# Patient Record
Sex: MALE | Race: WHITE | NOT HISPANIC OR LATINO | Employment: FULL TIME | URBAN - METROPOLITAN AREA
[De-identification: names, ages, dates, MRNs, and addresses within clinical notes are randomized per-mention and may not be internally consistent; named-entity substitution may affect disease eponyms.]

---

## 2024-10-04 ENCOUNTER — TELEPHONE (OUTPATIENT)
Dept: ORTHOPEDICS | Facility: CLINIC | Age: 54
End: 2024-10-04
Payer: COMMERCIAL

## 2024-10-04 NOTE — TELEPHONE ENCOUNTER
----- Message from Tech Cliff sent at 10/3/2024  3:39 PM CDT -----  Regarding: New Referral  Initial call completed, Fill-able uploaded    No Specialist     No known allergies or skin conditons     Dental   [facility]   -clr'd     Baldomero Daniels MD -ortho  [address], [place] [address] [phone]    Rayshawn Pacheco MD -pcp  [address], [place] [address] [phone]

## 2024-10-04 NOTE — TELEPHONE ENCOUNTER
- no WC/ litigation  - no active cancer  - no nicotine use (quit 8 years ago)  - not diabetic; advised of A1c   - BMI is 28, understands BMI requirement    Discussed program workflow and coverages. Aware he will need to board pets.   Ortho and pcp records requested in eHealth. Advised he may need additional bloodwork  Advised we have rec'd dental clearance.     He has my number to call with any questions.

## 2024-10-08 ENCOUNTER — PATIENT MESSAGE (OUTPATIENT)
Dept: ORTHOPEDICS | Facility: CLINIC | Age: 54
End: 2024-10-08
Payer: COMMERCIAL

## 2024-10-18 ENCOUNTER — TELEPHONE (OUTPATIENT)
Dept: ORTHOPEDICS | Facility: CLINIC | Age: 54
End: 2024-10-18
Payer: COMMERCIAL

## 2024-10-18 DIAGNOSIS — Z01.818 PREOP TESTING: Primary | ICD-10-CM

## 2024-10-18 RX ORDER — MULTIVITAMIN
1 TABLET ORAL DAILY
COMMUNITY

## 2024-10-18 RX ORDER — DEXTROMETHORPHAN HYDROBROMIDE, GUAIFENESIN 5; 100 MG/5ML; MG/5ML
650 LIQUID ORAL EVERY 8 HOURS
COMMUNITY

## 2024-10-18 RX ORDER — LATANOPROST 50 UG/ML
1 SOLUTION/ DROPS OPHTHALMIC NIGHTLY
COMMUNITY

## 2024-10-18 RX ORDER — BRIMONIDINE TARTRATE 1.5 MG/ML
1 SOLUTION/ DROPS OPHTHALMIC 3 TIMES DAILY
COMMUNITY

## 2024-10-18 NOTE — TELEPHONE ENCOUNTER
Spoke to patient on: 10/18/24    Surgeon:  Dr. Hubbard    Surgery: right Hip    Travel caregiver:  Samir Mcwilliams    Will travel via: car    BMI: 28.2    Social Hx:     Updated in Epic              Patient works at [employer]            Plan to return home after surgery:  Yes            Have support to help with care and appointments: Yes     Allergies: Updated in Epic    Medication list: Updated in Epic    Covid-19:   - vaccine: yes   - diagnosed with: no        Health Hx:  Updated in [facility]    Surgical Hx:   Updated in Epic    Can you take one flight of stairs: yes    RCRI:   - Coronary Artery Disease: no   - Congestive Heart Failure: no   - Cerebrovascular Disease: no   - Diabetes Mellitus on insulin: no      ROS:   - Fever/chills: no   - Infection: no   - Cough/Resp Issues:  no   - Bleeding/blood loss (vaginal, rectal, vomiting, urination):  no   - GI issues/Acid Reflux/Vomiting: no   - Fatty liver: no   - UTI: no   - Constipation:  no   - MRSA colonization: no  - Skin issues (rashes, blisters, boils, bumps): no; will let me know if anything changes   - Recent injection in operative joint: no   - Strokes or Passing out: no   - Blood Thinners or ASA: no   - Blood clot in heart/lung/extremity: no   - Stents: no   - Depression or Anxiety: yes - history of, situational, took medicine, no longer on meds but feels like he's coping well   - Problems with anesthesia:  no   - If female, having periods/pregnant: N/A  - Vision problems: yes, glasses  - Dental exam recently: yes    JOSHUA   Has WILLIE with CPAP    Family Hx:    Updated in Epic      Hip replacement patients do not have PT scheduled after returning home.     Discussed stay, DME, LUIS, PT - ample time allowed for question and answer, v/u of all teaching       [id number] - WIN

## 2024-10-21 ENCOUNTER — TELEPHONE (OUTPATIENT)
Dept: ORTHOPEDICS | Facility: CLINIC | Age: 54
End: 2024-10-21
Payer: COMMERCIAL

## 2024-10-22 ENCOUNTER — TELEPHONE (OUTPATIENT)
Dept: ORTHOPEDICS | Facility: CLINIC | Age: 54
End: 2024-10-22
Payer: COMMERCIAL

## 2024-10-22 PROBLEM — R42 VERTIGO: Status: ACTIVE | Noted: 2024-10-22

## 2024-10-22 PROBLEM — E78.5 HYPERLIPIDEMIA: Status: ACTIVE | Noted: 2024-10-22

## 2024-10-22 PROBLEM — G47.33 OSA (OBSTRUCTIVE SLEEP APNEA): Status: ACTIVE | Noted: 2024-10-22

## 2024-10-22 PROBLEM — K63.5 COLON POLYPS: Status: ACTIVE | Noted: 2024-10-22

## 2024-10-22 PROBLEM — M16.11 OSTEOARTHRITIS OF RIGHT HIP: Status: ACTIVE | Noted: 2024-10-22

## 2024-10-22 PROBLEM — G25.81 RESTLESS LEG: Status: ACTIVE | Noted: 2024-10-22

## 2024-10-22 PROBLEM — M54.50 LOW BACK PAIN: Status: ACTIVE | Noted: 2024-10-22

## 2024-10-22 NOTE — TELEPHONE ENCOUNTER
Contacted patient.   Updated on surgeon approval, still waiting on IM approval  Cannot make 11/11 date.  Would like 11/20 date.   Updated waiting on p/o agmt from [facility].

## 2024-10-23 ENCOUNTER — PATIENT MESSAGE (OUTPATIENT)
Dept: ORTHOPEDICS | Facility: CLINIC | Age: 54
End: 2024-10-23
Payer: COMMERCIAL

## 2024-10-23 DIAGNOSIS — M16.11 PRIMARY OSTEOARTHRITIS OF RIGHT HIP: Primary | ICD-10-CM

## 2024-10-23 NOTE — TELEPHONE ENCOUNTER
[phone]  Dr. Pacheco       - opt 3  Leave message for nurse - opt 4    Spoke with  - they did receive post op agreement letter.    Spoke with nurse - explained situation - said provider is in office and she will try to get an answer today and send it back

## 2024-11-18 ENCOUNTER — TELEPHONE (OUTPATIENT)
Dept: ORTHOPEDICS | Facility: CLINIC | Age: 54
End: 2024-11-18
Payer: COMMERCIAL

## 2024-11-18 NOTE — TELEPHONE ENCOUNTER
I called the patient today regarding surgery on 11/20/2024 with Dr. Jarod Hubbard. I informed the patient that his surgery will be at  [facility] ([facility] [place] [place] [address]). I informed the patient they must arrive at 7:30am and their surgery will start at 9:30am.     Per the [facility] COVID-19 Pre-Procedural Testing Policy (administered 10/26/2022), patients do not need tested for COVID-19 regardless of vaccination status.    I reminded the patient that they cannot eat or drink after midnight, the night before surgery.      I reminded the patient to be careful of their skin over the next few days to make sure they do not get any cuts, scratches or scrapes.    The patient has not yet received their walker, bedside commode or shower chair from [facility]. I told the patient that she needs to call [facility] today at [phone].    The patient verbalized understanding and has no further questions.

## 2024-11-18 NOTE — ASSESSMENT & PLAN NOTE
This client has a possible diagnosis of obstructive sleep apnea (WILLIE)  Instructions were given to avoid the following: sleeping supine, weight gain ,alcoholic beverages , sedative medications, and CNS depressant use as these can all worsen WILLIE.    Untreated sleep apnea has been shown to increase daytime sleepiness, hypertension, heart disease and stroke which were discussed with the patient at this time  Please use caution with medications that induce respiratory depression in the perioperative period

## 2024-11-19 ENCOUNTER — OFFICE VISIT (OUTPATIENT)
Dept: ORTHOPEDICS | Facility: CLINIC | Age: 54
End: 2024-11-19
Payer: COMMERCIAL

## 2024-11-19 ENCOUNTER — HOSPITAL ENCOUNTER (OUTPATIENT)
Dept: RADIOLOGY | Facility: HOSPITAL | Age: 54
Discharge: HOME | End: 2024-11-19
Attending: ORTHOPAEDIC SURGERY
Payer: COMMERCIAL

## 2024-11-19 ENCOUNTER — OFFICE VISIT (OUTPATIENT)
Dept: INTERNAL MEDICINE | Facility: CLINIC | Age: 54
End: 2024-11-19
Payer: COMMERCIAL

## 2024-11-19 ENCOUNTER — TELEPHONE (OUTPATIENT)
Dept: ORTHOPEDICS | Facility: CLINIC | Age: 54
End: 2024-11-19
Payer: COMMERCIAL

## 2024-11-19 VITALS
HEART RATE: 58 BPM | TEMPERATURE: 98 F | BODY MASS INDEX: 28.85 KG/M2 | SYSTOLIC BLOOD PRESSURE: 143 MMHG | HEIGHT: 73 IN | OXYGEN SATURATION: 98 % | WEIGHT: 217.69 LBS | DIASTOLIC BLOOD PRESSURE: 83 MMHG

## 2024-11-19 VITALS — WEIGHT: 215.94 LBS | BODY MASS INDEX: 29.25 KG/M2 | HEIGHT: 72 IN

## 2024-11-19 DIAGNOSIS — R03.0 ELEVATED BLOOD PRESSURE READING IN OFFICE WITHOUT DIAGNOSIS OF HYPERTENSION: ICD-10-CM

## 2024-11-19 DIAGNOSIS — M16.11 PRIMARY OSTEOARTHRITIS OF RIGHT HIP: Primary | ICD-10-CM

## 2024-11-19 DIAGNOSIS — M16.11 PRIMARY OSTEOARTHRITIS OF RIGHT HIP: ICD-10-CM

## 2024-11-19 DIAGNOSIS — G25.81 RESTLESS LEG: ICD-10-CM

## 2024-11-19 DIAGNOSIS — G47.33 OSA (OBSTRUCTIVE SLEEP APNEA): Primary | ICD-10-CM

## 2024-11-19 DIAGNOSIS — E78.5 HYPERLIPIDEMIA, UNSPECIFIED HYPERLIPIDEMIA TYPE: ICD-10-CM

## 2024-11-19 DIAGNOSIS — R42 VERTIGO: ICD-10-CM

## 2024-11-19 DIAGNOSIS — Z96.641 S/P TOTAL RIGHT HIP ARTHROPLASTY: Primary | ICD-10-CM

## 2024-11-19 PROCEDURE — 99499 UNLISTED E&M SERVICE: CPT | Mod: S$GLB,COE,,

## 2024-11-19 PROCEDURE — 99203 OFFICE O/P NEW LOW 30 MIN: CPT | Mod: S$GLB,COE,, | Performed by: NURSE PRACTITIONER

## 2024-11-19 PROCEDURE — 99999 PR PBB SHADOW E&M-EST. PATIENT-LVL II: CPT | Mod: PBBFAC,COE,,

## 2024-11-19 PROCEDURE — 99999 PR PBB SHADOW E&M-EST. PATIENT-LVL III: CPT | Mod: PBBFAC,COE,, | Performed by: ORTHOPAEDIC SURGERY

## 2024-11-19 PROCEDURE — 72170 X-RAY EXAM OF PELVIS: CPT | Mod: TC

## 2024-11-19 PROCEDURE — 99204 OFFICE O/P NEW MOD 45 MIN: CPT | Mod: S$GLB,COE,, | Performed by: ORTHOPAEDIC SURGERY

## 2024-11-19 PROCEDURE — 72170 X-RAY EXAM OF PELVIS: CPT | Mod: 26,COE,, | Performed by: RADIOLOGY

## 2024-11-19 PROCEDURE — 99999 PR PBB SHADOW E&M-EST. PATIENT-LVL III: CPT | Mod: PBBFAC,COE,, | Performed by: NURSE PRACTITIONER

## 2024-11-19 RX ORDER — FAMOTIDINE 20 MG/1
20 TABLET, FILM COATED ORAL 2 TIMES DAILY
Status: CANCELLED | OUTPATIENT
Start: 2024-11-19

## 2024-11-19 RX ORDER — ACETAMINOPHEN 500 MG
1000 TABLET ORAL EVERY 6 HOURS
Status: CANCELLED | OUTPATIENT
Start: 2024-11-19

## 2024-11-19 RX ORDER — PREGABALIN 75 MG/1
75 CAPSULE ORAL
Status: CANCELLED | OUTPATIENT
Start: 2024-11-19

## 2024-11-19 RX ORDER — PROCHLORPERAZINE EDISYLATE 5 MG/ML
5 INJECTION INTRAMUSCULAR; INTRAVENOUS EVERY 6 HOURS PRN
Status: CANCELLED | OUTPATIENT
Start: 2024-11-19

## 2024-11-19 RX ORDER — SODIUM CHLORIDE 9 MG/ML
INJECTION, SOLUTION INTRAVENOUS
Status: CANCELLED | OUTPATIENT
Start: 2024-11-19

## 2024-11-19 RX ORDER — CELECOXIB 200 MG/1
200 CAPSULE ORAL DAILY
Qty: 30 CAPSULE | Refills: 0 | Status: SHIPPED | OUTPATIENT
Start: 2024-11-19

## 2024-11-19 RX ORDER — OXYCODONE HYDROCHLORIDE 5 MG/1
TABLET ORAL
Qty: 30 TABLET | Refills: 0 | Status: SHIPPED | OUTPATIENT
Start: 2024-11-19

## 2024-11-19 RX ORDER — ONDANSETRON HYDROCHLORIDE 2 MG/ML
4 INJECTION, SOLUTION INTRAVENOUS EVERY 8 HOURS PRN
Status: CANCELLED | OUTPATIENT
Start: 2024-11-19

## 2024-11-19 RX ORDER — CELECOXIB 200 MG/1
200 CAPSULE ORAL DAILY
Status: CANCELLED | OUTPATIENT
Start: 2024-11-20

## 2024-11-19 RX ORDER — NALOXONE HCL 0.4 MG/ML
0.02 VIAL (ML) INJECTION
Status: CANCELLED | OUTPATIENT
Start: 2024-11-19

## 2024-11-19 RX ORDER — FENTANYL CITRATE 50 UG/ML
25 INJECTION, SOLUTION INTRAMUSCULAR; INTRAVENOUS EVERY 5 MIN PRN
Status: CANCELLED | OUTPATIENT
Start: 2024-11-19

## 2024-11-19 RX ORDER — CELECOXIB 200 MG/1
400 CAPSULE ORAL ONCE
Status: CANCELLED | OUTPATIENT
Start: 2024-11-19 | End: 2024-11-19

## 2024-11-19 RX ORDER — ACETAMINOPHEN 500 MG
1000 TABLET ORAL
Status: CANCELLED | OUTPATIENT
Start: 2024-11-19

## 2024-11-19 RX ORDER — MORPHINE SULFATE 2 MG/ML
2 INJECTION, SOLUTION INTRAMUSCULAR; INTRAVENOUS
Status: CANCELLED | OUTPATIENT
Start: 2024-11-19

## 2024-11-19 RX ORDER — ASPIRIN 81 MG/1
81 TABLET ORAL 2 TIMES DAILY
Qty: 60 TABLET | Refills: 0 | Status: SHIPPED | OUTPATIENT
Start: 2024-11-19 | End: 2024-12-21

## 2024-11-19 RX ORDER — ASPIRIN 81 MG/1
81 TABLET ORAL 2 TIMES DAILY
Status: CANCELLED | OUTPATIENT
Start: 2024-11-19

## 2024-11-19 RX ORDER — FENTANYL CITRATE 50 UG/ML
100 INJECTION, SOLUTION INTRAMUSCULAR; INTRAVENOUS
Status: CANCELLED | OUTPATIENT
Start: 2024-11-19 | End: 2024-11-20

## 2024-11-19 RX ORDER — PREGABALIN 75 MG/1
75 CAPSULE ORAL NIGHTLY
Status: CANCELLED | OUTPATIENT
Start: 2024-11-19

## 2024-11-19 RX ORDER — POLYETHYLENE GLYCOL 3350 17 G/17G
17 POWDER, FOR SOLUTION ORAL DAILY PRN
Status: CANCELLED | OUTPATIENT
Start: 2024-11-19

## 2024-11-19 RX ORDER — DEXTROMETHORPHAN HYDROBROMIDE, GUAIFENESIN 5; 100 MG/5ML; MG/5ML
650 LIQUID ORAL EVERY 8 HOURS
Qty: 120 TABLET | Refills: 0 | Status: SHIPPED | OUTPATIENT
Start: 2024-11-19

## 2024-11-19 RX ORDER — LIDOCAINE HYDROCHLORIDE 10 MG/ML
1 INJECTION, SOLUTION EPIDURAL; INFILTRATION; INTRACAUDAL; PERINEURAL
Status: CANCELLED | OUTPATIENT
Start: 2024-11-19

## 2024-11-19 RX ORDER — MUPIROCIN 20 MG/G
1 OINTMENT TOPICAL 2 TIMES DAILY
Status: CANCELLED | OUTPATIENT
Start: 2024-11-19 | End: 2024-11-24

## 2024-11-19 RX ORDER — POLYETHYLENE GLYCOL 3350 17 G/17G
17 POWDER, FOR SOLUTION ORAL DAILY
Status: CANCELLED | OUTPATIENT
Start: 2024-11-20

## 2024-11-19 RX ORDER — AMOXICILLIN 250 MG
1 CAPSULE ORAL 2 TIMES DAILY
Status: CANCELLED | OUTPATIENT
Start: 2024-11-19

## 2024-11-19 RX ORDER — SODIUM CHLORIDE 9 MG/ML
INJECTION, SOLUTION INTRAVENOUS CONTINUOUS
Status: CANCELLED | OUTPATIENT
Start: 2024-11-19 | End: 2024-11-20

## 2024-11-19 RX ORDER — OXYCODONE HYDROCHLORIDE 5 MG/1
10 TABLET ORAL
Status: CANCELLED | OUTPATIENT
Start: 2024-11-19

## 2024-11-19 RX ORDER — MIDAZOLAM HYDROCHLORIDE 1 MG/ML
4 INJECTION, SOLUTION INTRAMUSCULAR; INTRAVENOUS
Status: CANCELLED | OUTPATIENT
Start: 2024-11-19 | End: 2024-11-20

## 2024-11-19 RX ORDER — CEFADROXIL 500 MG/1
500 CAPSULE ORAL EVERY 12 HOURS
Qty: 14 CAPSULE | Refills: 0 | Status: SHIPPED | OUTPATIENT
Start: 2024-11-19 | End: 2024-11-28

## 2024-11-19 RX ORDER — MUPIROCIN 20 MG/G
1 OINTMENT TOPICAL
Status: CANCELLED | OUTPATIENT
Start: 2024-11-19

## 2024-11-19 RX ORDER — METHOCARBAMOL 750 MG/1
750 TABLET, FILM COATED ORAL 4 TIMES DAILY PRN
Qty: 40 TABLET | Refills: 0 | Status: SHIPPED | OUTPATIENT
Start: 2024-11-19 | End: 2024-12-01

## 2024-11-19 RX ORDER — METHOCARBAMOL 750 MG/1
750 TABLET, FILM COATED ORAL 3 TIMES DAILY
Status: CANCELLED | OUTPATIENT
Start: 2024-11-19

## 2024-11-19 RX ORDER — PANTOPRAZOLE SODIUM 40 MG/1
40 TABLET, DELAYED RELEASE ORAL DAILY
Qty: 30 TABLET | Refills: 0 | Status: SHIPPED | OUTPATIENT
Start: 2024-11-19 | End: 2024-12-21

## 2024-11-19 RX ORDER — AMOXICILLIN 250 MG
1 CAPSULE ORAL DAILY
Qty: 30 TABLET | Refills: 0 | Status: SHIPPED | OUTPATIENT
Start: 2024-11-19

## 2024-11-19 RX ORDER — OXYCODONE HYDROCHLORIDE 5 MG/1
5 TABLET ORAL
Status: CANCELLED | OUTPATIENT
Start: 2024-11-19

## 2024-11-19 NOTE — HPI
This is a 54 y.o. male  who presents today for a preoperative evaluation in preparation for right hip replacement  Surgery is indicated for   osteoarthritis  .   Patient is new to me.    The history has been obtained by speaking with the patient and reviewing the electronic medical record and/or outside health information. Significant health conditions for the perioperative period are discussed below in assessment and plan.     Patient reports current health status to be Good.  Denies any new symptoms before surgery.

## 2024-11-19 NOTE — PROGRESS NOTES
[facility] [address]  Progress Note    Patient Name: Vladimir Frank  MRN: [id number]  Date of Evaluation- 11/19/2024  PCP- Rayshawn Pacheco MD    Future cases for Vladimir Frank [[id number]]       Case ID Status Date Time [place] Procedure Provider Location    [id number] [facility] 11/20/2024  9:30  ARTHROPLASTY, HIP, TOTAL, ANTERIOR APPROACH: RIGHT: DEPUY - ACTIS + PINNACLE: Jarod Yao III, MD [[id number]] [facility] OR            HPI:  This is a 54 y.o. male  who presents today for a preoperative evaluation in preparation for right hip replacement  Surgery is indicated for   osteoarthritis  .   Patient is new to me.    The history has been obtained by speaking with the patient and reviewing the electronic medical record and/or outside health information. Significant health conditions for the perioperative period are discussed below in assessment and plan.     Patient reports current health status to be Good.  Denies any new symptoms before surgery.       Subjective/ Objective:     Chief Complaint: Preoperative evaulation, perioperative medical management, and complication reduction plan.     Functional Capacity:  Able to climb a flight of stairs without CP SOB or Syncope.  Able to meet 4 METs      Anesthesia issues: None    Difficulty mouth opening: none    Steroid use in the last 12 months: celestone July for back pain    Dental Issues: none    Family anesthesia difficulty: None       Family Hx of Thrombosis  grandmother had h/o blood clots    Past Medical History:   Diagnosis Date    GERD (gastroesophageal reflux disease)     History of colonic polyps     Hypercholesteremia     Lumbosacral spondylosis with radiculopathy     WILLIE on CPAP     Primary osteoarthritis of right hip        Past Surgical History:   Procedure Laterality Date    COLONOSCOPY W/ BIOPSIES AND POLYPECTOMY      WISDOM TOOTH EXTRACTION         Review of Systems   Constitutional:  Negative for chills, fatigue, fever and unexpected weight change.  "  HENT:  Negative for trouble swallowing and voice change.    Eyes:  Negative for photophobia and visual disturbance.        No acute visual changes   Respiratory:  Negative for cough, shortness of breath and wheezing.         Has WILLIE and wears CPAP   Cardiovascular:  Negative for chest pain, palpitations and leg swelling.   Gastrointestinal:  Positive for constipation. Negative for abdominal pain, blood in stool, diarrhea, nausea and vomiting.        No FLD, Hepatitis, Cirrhosis  No BRB or black tarry stool   Genitourinary:  Negative for difficulty urinating, dysuria, frequency, hematuria and urgency.        Nocturia   Musculoskeletal:  Positive for arthralgias, back pain, gait problem and neck pain. Negative for myalgias and neck stiffness.   Neurological:  Negative for dizziness, tremors, seizures, syncope, weakness, light-headedness, numbness and headaches.   Psychiatric/Behavioral:  Negative for agitation, behavioral problems, confusion, decreased concentration, dysphoric mood, hallucinations, self-injury, sleep disturbance and suicidal ideas. The patient is not nervous/anxious and is not hyperactive.           VITALS  Visit Vitals  BP (!) 143/83 (BP Location: Right arm, Patient Position: Sitting)   Pulse (!) 58   Temp 98.2 °F (36.8 °C) (Oral)   Ht 6' 1" (1.854 m)   Wt 98.8 kg (217 lb 11.3 oz)   SpO2 98%   BMI 28.72 kg/m²          Physical Exam  Constitutional:       General: He is not in acute distress.     Appearance: He is well-developed. He is not diaphoretic.   HENT:      Head: Normocephalic.      Right Ear: Hearing normal.      Left Ear: Hearing normal.      Nose: Nose normal.      Mouth/Throat:      Lips: Pink.      Mouth: Mucous membranes are moist.   Eyes:      General: Lids are normal.      Conjunctiva/sclera: Conjunctivae normal.      Pupils: Pupils are equal, round, and reactive to light.   Neck:      Vascular: No carotid bruit.      Trachea: Trachea and phonation normal.   Cardiovascular:      Rate " and Rhythm: Normal rate and regular rhythm.      Pulses:           Carotid pulses are 2+ on the right side and 2+ on the left side.       Radial pulses are 2+ on the right side and 2+ on the left side.        Posterior tibial pulses are 2+ on the right side and 2+ on the left side.      Heart sounds: Normal heart sounds. No murmur heard.     No friction rub. No gallop.   Pulmonary:      Effort: Pulmonary effort is normal.      Breath sounds: Normal breath sounds.      Comments: Clear and equal  Anterior and Posterior BBS  Abdominal:      General: Abdomen is protuberant. Bowel sounds are normal. There is no distension.      Palpations: Abdomen is soft.      Tenderness: There is no abdominal tenderness.   Musculoskeletal:         General: No tenderness or deformity. Normal range of motion.      Cervical back: Normal range of motion.      Right lower leg: No edema.      Left lower leg: No edema.   Lymphadenopathy:      Head:      Right side of head: No submental, submandibular, tonsillar, preauricular, posterior auricular or occipital adenopathy.      Left side of head: No submental, submandibular, tonsillar, preauricular, posterior auricular or occipital adenopathy.      Cervical:      Right cervical: No superficial cervical adenopathy.     Left cervical: No superficial cervical adenopathy.   Skin:     General: Skin is warm and dry.      Capillary Refill: Capillary refill takes 2 to 3 seconds.      Coloration: Skin is not pale.      Findings: No erythema or rash.   Neurological:      Mental Status: He is alert and oriented to person, place, and time.      GCS: GCS eye subscore is 4. GCS verbal subscore is 5. GCS motor subscore is 6.      Motor: No abnormal muscle tone.      Coordination: Coordination normal.   Psychiatric:         Attention and Perception: Attention and perception normal.         Mood and Affect: Mood and affect normal.         Speech: Speech normal.         Behavior: Behavior normal. Behavior is  cooperative.         Thought Content: Thought content normal.         Cognition and Memory: Cognition and memory normal.         Judgment: Judgment normal.          Significant Labs:  Lab Results   Component Value Date    INR 1.04 10/18/2024    HGBA1C 5.2 10/18/2024       Diagnostic Studies: No relevant studies.    EKG:   No results found for this or any previous visit.    2D ECHO:  TTE:  No results found for this or any previous visit.    KAILEE:  No results found for this or any previous visit.     Imaging     Active Cardiac Conditions: None      Revised Cardiac Risk Index   High -Risk Surgery  Intraperitoneal; Intrathoracic; suprainguinal vascular Yes- + 1 No- 0   History of Ischemic Heart Disease   (Hx of MI/positive exercise test/current chest pain due to ischemia/use of nitrate therapy/EKG with pathological Q waves) Yes- + 1 No- 0   History of CHF  (Pulmonary edema/bilateral rales or S3 gallop/PND/CXR showing pulmonary vascular redistribution) Yes- + 1 No- 0   History of CVA   (Prior stroke or TIA) Yes- + 1 No- 0   Pre-operative treatment with insulin Yes- + 1 No- 0   Pre-operative creatinine > 2mg/dl Yes- + 1 No- 0   Total:    Risk Status:  Estimated risk of cardiac complications after non-cardiac surgery using the Revised Cardiac Risk Index for Preoperative risk is 3.9 %      ARISCAT (Canet) risk index: Low: 1.6% risk of post-op pulmonary complications.    American Society of Anesthesiologists Physical Status classification (ASA): 3             Preoperative cardiac risk assessment-  The patient does not have any active cardiac conditions . Revised cardiac risk index predictors- ---.Functional capacity is more than 4 Mets. He will be undergoing a Orthopedic procedure that carries a Moderate Risk and High Risk risk     The estimated risk of the rate of adverse cardiac outcomes  3.9    No further cardiac work up is indicated prior to proceeding with the surgery          American Society of Anesthesiologists  Physical status classification ( ASA ) class: 3  Postoperative pulmonary complication risk assessment: 1.6     ARISCAT ( Canet) risk index- risk class -  Low, if duration of surgery is under 3 hours, intermediate, if duration of surgery is over 3 hours      Assessment/Plan:     WILLIE (obstructive sleep apnea)  This client has a possible diagnosis of obstructive sleep apnea (WILLIE)  Instructions were given to avoid the following: sleeping supine, weight gain ,alcoholic beverages , sedative medications, and CNS depressant use as these can all worsen WILLIE.    Untreated sleep apnea has been shown to increase daytime sleepiness, hypertension, heart disease and stroke which were discussed with the patient at this time  Please use caution with medications that induce respiratory depression in the perioperative period    Hyperlipidemia  No longer takes Lipitor    Osteoarthritis of right hip  Surgery scheduled 11/20/24    Restless leg  Has RLS- mostly happens when stressed  No meds    Vertigo  Has not had vertigo for about 9 years    Elevated blood pressure reading in office without diagnosis of hypertension  /83      Preventive perioperative care    Thromboembolic prophylaxis:  His risk factors for thrombosis include surgical procedure, age, and reduced mobility.I suggest  thromboembolic prophylaxis ( mechanical/pharmacological, weighing the risk benefits of pharmacological agent use considering laurie procedural bleeding )  during the perioperative period.I suggested being active in the post operative period.      Postoperative pulmonary complication prophylaxis-Risk factors for post operative pulmonary complications include ASA class >2- I suggest incentive spirometry use, early ambulation, and pain control so as to avoid diaphragmatic splinting  Brush teeth twice per day, oral rinses, sleep with the head of the bed up 30 degrees     Renal complication prophylaxis . I suggest keeping him well hydrated and avoidance/  minimizing the use of  NSAID's,CABALLERO 2 Inhibitors ,IV contrast if possible in the perioperative period.I suggested drinking 2 litre's of water a day      Surgical site Infection Prophylaxis-I  suggest appropriate antibiotic for Prophylaxis against Surgical site infections Shower with Hibiclens in the night before surgery and the morning of surgery       In view of  the patient  is at risk of postoperative urinary retention.  I suggest avoidance / minimizing the of  Benzodiazepines,Anticholinergic medication,antihistamines ( Benadryl) , if possible in the perioperative period. I suggest using the minimum possible use of opioids for the minimum period of time in the perioperative period. Benadryl avoidance suggested      This visit was focused on Preoperative evaluation, Perioperative Medical management, complication reduction plans. I suggest that the patient follows up with primary care or relevant sub specialists for ongoing health care.    I appreciate the opportunity to be involved in this patients care. Please feel free to contact me if there were any questions about this consultation.    Patient is optimized      I spent a total of 30 minutes on the day of the visit.This includes face to face time and non-face to face time preparing to see the patient (eg, review of tests), obtaining and/or reviewing separately obtained history, documenting clinical information in the electronic or other health record, independently interpreting results and communicating results to the patient/family/caregiver, or care coordinator.      Chema Flores NP  Perioperative Medicine  [facility]   Pager [phone]

## 2024-11-19 NOTE — H&P
CC:  Right hip pain    Vladimir Frank is a 54 y.o. male with Right hip pain.  Pain is worse with activity and weight bearing.  Patient has experienced interference of activities of daily living due to increased pain and decreased range of motion. Patient has failed non-operative treatment including NSAIDs, as well as greater than 3 months of activity modification. Vladimir Frank ambulates independently.     Relevant medical conditions of significance in perioperative period:  HLD- monitored by PCP  WILLIE- monitored by PCP       Given documented medical comorbidities including those listed above and based off of CMS criteria patient would meet inpatient admission status guidelines. This case has been approved as inpatient.     Past Medical History:   Diagnosis Date    History of colonic polyps     History of vitamin D deficiency     Hypercholesteremia     Lumbosacral spondylosis with radiculopathy     WILLIE on CPAP     Primary osteoarthritis of right hip        Past Surgical History:   Procedure Laterality Date    COLONOSCOPY W/ BIOPSIES AND POLYPECTOMY      WISDOM TOOTH EXTRACTION         Family History   Problem Relation Name Age of Onset    Cancer Mother      Hypertension Maternal Grandmother      Hypertension Maternal Grandfather      Hypertension Paternal Grandmother      Deep vein thrombosis Paternal Grandmother      Hypertension Paternal Grandfather         Review of patient's allergies indicates:  No Known Allergies      Current Outpatient Medications:     acetaminophen (TYLENOL) 650 MG TbSR, Take 650 mg by mouth every 8 (eight) hours., Disp: , Rfl:     brimonidine 0.15 % OPTH DROP (ALPHAGAN) 0.15 % ophthalmic solution, 1 drop 3 (three) times daily., Disp: , Rfl:     latanoprost 0.005 % ophthalmic solution, 1 drop every evening., Disp: , Rfl:     multivitamin (THERAGRAN) per tablet, Take 1 tablet by mouth once daily., Disp: , Rfl:     Review of Systems:   Constitutional: no fever or chills  Eyes: no visual  changes  ENT: no nasal congestion or sore throat  Respiratory: no cough or shortness of breath  Cardiovascular: no chest pain or palpitations  Gastrointestinal: no nausea or vomiting, tolerating diet  Genitourinary: no hematuria or dysuria  Integument/Breast: no rash or pruritis  Hematologic/Lymphatic: no easy bruising or lymphadenopathy  Musculoskeletal: positive for hip pain  Neurological: no seizures or tremors  Behavioral/Psych: no auditory or visual hallucinations  Endocrine: no heat or cold intolerance    PE:  There were no vitals taken for this visit.  General: Pleasant, cooperative, NAD   Gait: antalgic  HEENT: NCAT, sclera nonicteric   Lungs: Respirations are clear, equal and unlabored.   CV: S1S2; 2+ bilateral upper and lower extremity pulses.   Skin: Intact throughout LE with no rashes, erythema, or lesions  Extremities: No LE edema, NVI lower extremities    Right Hip Exam:  80 degrees flexion  10 degrees extension   10 degrees internal rotation  20 degrees external rotation  20 degrees abduction  20 degrees adduction  There is pain with passive range of motion.     Radiographs: Radiographs reveal advanced degenerative changes including subchondral cyst formation, subchondral sclerosis, osteophyte formation, joint space narrowing.     Diagnosis: Primary osteoarthritis Right hip    Plan: Right total hip arthroplasty     Due to the serious nature of total joint infection and the high prevalence of community acquired MRSA, vancomycin will be used perioperatively.

## 2024-11-19 NOTE — PROGRESS NOTES
Vladimir Frank is a 54 y.o. year old here today for preoperative visit in preparation for a Right total hip arthroplasty to be performed by Dr. Hubbard on 11/20/24.  he was last seen and treated in the clinic on Visit date not found. he will be medically optimized by the pre op center. There has been no significant change in medical status since last visit. No fever, chills, malaise, or unexplained weight change.      Allergies, Medications, past medical and surgical history reviewed.    Focused examination performed.    Patient saw surgeon in clinic today. All questions answered. Patient encouraged to call with questions. Contact information given.     Pre, laurie, and post operative procedures and expectations discussed. Goals of successful surgery reviewed and include manageable pain levels, surgical site free of infection, medication management, and ambulation with PT/OT assistance. Healthy weight management discussed with patient and caregiver who were receptive to eduction of healthy diet and activity. No other necessary lifestyle changes identified. Educated patient about signs and symptoms of infection, medication management, anticoagulation therapy, risk of tobacco and alcohol use, and self-care to promote healing. Surgical guide given for future reference. Hibiclens given to patient with instructions. All questions were answered.     Vladimir Frank verbalized an understanding to the education and goals. Patient has displayed readiness to engage in care and is ready to proceed with surgery.  Patient reports friend is able and ready to provide assistance at home after discharge.    Surgical and blood consents signed.    DME will be arranged. The mobility limitation cannot be sufficiently resolved by the use of a cane. Patient's functional mobility deficit can be sufficiently resolved with the use of a (Rolling Walker or Walker). Patient's mobility limitation significantly impairs their ability to participate in one  "of more activities of daily living. The use of a (Rolling Walker or Walker) will significantly improve the patient's ability to participate in MRADLS and the patient will use it on regular basis in the home."     Vladimir Frank will contact us if there are any questions, concerns, or changes in medical status prior to surgery.       Joint class:  Private    Patient has discussed discharge planning with surgeon. Patient will be discharged to home following surgery.   patient will be scheduled with Home Health during hospitalization.    30 minutes of time was spent on patient education, review of records, templating, H&P, , appointment scheduling and optimizing patient for surgery.      "

## 2024-11-19 NOTE — OUTPATIENT SUBJECTIVE & OBJECTIVE
Outpatient Subjective & Objective      Chief Complaint: Preoperative evaulation, perioperative medical management, and complication reduction plan.     Functional Capacity:  Able to climb a flight of stairs without CP SOB or Syncope.  Able to meet 4 METs      Anesthesia issues: None    Difficulty mouth opening: none    Steroid use in the last 12 months: celestone July for back pain    Dental Issues: none    Family anesthesia difficulty: None       Family Hx of Thrombosis  grandmother had h/o blood clots    Past Medical History:   Diagnosis Date    GERD (gastroesophageal reflux disease)     History of colonic polyps     Hypercholesteremia     Lumbosacral spondylosis with radiculopathy     WILLIE on CPAP     Primary osteoarthritis of right hip        Past Surgical History:   Procedure Laterality Date    COLONOSCOPY W/ BIOPSIES AND POLYPECTOMY      WISDOM TOOTH EXTRACTION         Review of Systems   Constitutional:  Negative for chills, fatigue, fever and unexpected weight change.   HENT:  Negative for trouble swallowing and voice change.    Eyes:  Negative for photophobia and visual disturbance.        No acute visual changes   Respiratory:  Negative for cough, shortness of breath and wheezing.         Has WILLIE and wears CPAP   Cardiovascular:  Negative for chest pain, palpitations and leg swelling.   Gastrointestinal:  Positive for constipation. Negative for abdominal pain, blood in stool, diarrhea, nausea and vomiting.        No FLD, Hepatitis, Cirrhosis  No BRB or black tarry stool   Genitourinary:  Negative for difficulty urinating, dysuria, frequency, hematuria and urgency.        Nocturia   Musculoskeletal:  Positive for arthralgias, back pain, gait problem and neck pain. Negative for myalgias and neck stiffness.   Neurological:  Negative for dizziness, tremors, seizures, syncope, weakness, light-headedness, numbness and headaches.   Psychiatric/Behavioral:  Negative for agitation, behavioral problems, confusion,  "decreased concentration, dysphoric mood, hallucinations, self-injury, sleep disturbance and suicidal ideas. The patient is not nervous/anxious and is not hyperactive.           VITALS  Visit Vitals  BP (!) 143/83 (BP Location: Right arm, Patient Position: Sitting)   Pulse (!) 58   Temp 98.2 °F (36.8 °C) (Oral)   Ht 6' 1" (1.854 m)   Wt 98.8 kg (217 lb 11.3 oz)   SpO2 98%   BMI 28.72 kg/m²          Physical Exam  Constitutional:       General: He is not in acute distress.     Appearance: He is well-developed. He is not diaphoretic.   HENT:      Head: Normocephalic.      Right Ear: Hearing normal.      Left Ear: Hearing normal.      Nose: Nose normal.      Mouth/Throat:      Lips: Pink.      Mouth: Mucous membranes are moist.   Eyes:      General: Lids are normal.      Conjunctiva/sclera: Conjunctivae normal.      Pupils: Pupils are equal, round, and reactive to light.   Neck:      Vascular: No carotid bruit.      Trachea: Trachea and phonation normal.   Cardiovascular:      Rate and Rhythm: Normal rate and regular rhythm.      Pulses:           Carotid pulses are 2+ on the right side and 2+ on the left side.       Radial pulses are 2+ on the right side and 2+ on the left side.        Posterior tibial pulses are 2+ on the right side and 2+ on the left side.      Heart sounds: Normal heart sounds. No murmur heard.     No friction rub. No gallop.   Pulmonary:      Effort: Pulmonary effort is normal.      Breath sounds: Normal breath sounds.      Comments: Clear and equal  Anterior and Posterior BBS  Abdominal:      General: Abdomen is protuberant. Bowel sounds are normal. There is no distension.      Palpations: Abdomen is soft.      Tenderness: There is no abdominal tenderness.   Musculoskeletal:         General: No tenderness or deformity. Normal range of motion.      Cervical back: Normal range of motion.      Right lower leg: No edema.      Left lower leg: No edema.   Lymphadenopathy:      Head:      Right side of " head: No submental, submandibular, tonsillar, preauricular, posterior auricular or occipital adenopathy.      Left side of head: No submental, submandibular, tonsillar, preauricular, posterior auricular or occipital adenopathy.      Cervical:      Right cervical: No superficial cervical adenopathy.     Left cervical: No superficial cervical adenopathy.   Skin:     General: Skin is warm and dry.      Capillary Refill: Capillary refill takes 2 to 3 seconds.      Coloration: Skin is not pale.      Findings: No erythema or rash.   Neurological:      Mental Status: He is alert and oriented to person, place, and time.      GCS: GCS eye subscore is 4. GCS verbal subscore is 5. GCS motor subscore is 6.      Motor: No abnormal muscle tone.      Coordination: Coordination normal.   Psychiatric:         Attention and Perception: Attention and perception normal.         Mood and Affect: Mood and affect normal.         Speech: Speech normal.         Behavior: Behavior normal. Behavior is cooperative.         Thought Content: Thought content normal.         Cognition and Memory: Cognition and memory normal.         Judgment: Judgment normal.          Significant Labs:  Lab Results   Component Value Date    INR 1.04 10/18/2024    HGBA1C 5.2 10/18/2024       Diagnostic Studies: No relevant studies.    EKG:   No results found for this or any previous visit.    2D ECHO:  TTE:  No results found for this or any previous visit.    KAILEE:  No results found for this or any previous visit.     Imaging     Active Cardiac Conditions: None      Revised Cardiac Risk Index   High -Risk Surgery  Intraperitoneal; Intrathoracic; suprainguinal vascular Yes- + 1 No- 0   History of Ischemic Heart Disease   (Hx of MI/positive exercise test/current chest pain due to ischemia/use of nitrate therapy/EKG with pathological Q waves) Yes- + 1 No- 0   History of CHF  (Pulmonary edema/bilateral rales or S3 gallop/PND/CXR showing pulmonary vascular  redistribution) Yes- + 1 No- 0   History of CVA   (Prior stroke or TIA) Yes- + 1 No- 0   Pre-operative treatment with insulin Yes- + 1 No- 0   Pre-operative creatinine > 2mg/dl Yes- + 1 No- 0   Total:    Risk Status:  Estimated risk of cardiac complications after non-cardiac surgery using the Revised Cardiac Risk Index for Preoperative risk is 3.9 %      ARISCAT (Canet) risk index: Low: 1.6% risk of post-op pulmonary complications.    American Society of Anesthesiologists Physical Status classification (ASA): 3         Outpatient Subjective & Objective

## 2024-11-19 NOTE — PROGRESS NOTES
Subjective:     HPI:   Vladimir Frank is a 54 y.o. male who presents for R [facility]     History of Present Illness    CHIEF COMPLAINT:  - Vladimir presents today for initial consultation regarding right hip pain.    HPI:  Vladimir presents with right hip pain bothering him for approximately 3-4 years. The pain is localized to the right hip area, radiating down to his ankle, and occasionally across his back. He experiences exacerbations about once every six months. He has previously received Celestone injections in his lumbar spine for pain management, with the last injection occurring approximately six months ago. He denies using any assistive devices such as a cane, walker, or crutches. He works as a pharmacy tech and expresses a preference for non-narcotic pain management options, stating a preference for Tylenol and Tramadol over narcotic options like Percocet. He reports difficulty walking his dog due to the hip pain and mentions a numb spot on his right mid-thigh present for a few years. The symptoms have significantly impacted his mobility and quality of life.    Vladimir denies any major heart, lung, liver, or kidney problems, heart attacks, stents in the heart, diabetes, history of blood clots in the veins of the legs or lungs, and previous surgeries on the back.    MEDICATIONS:  - Tylenol: for hip pain  - Tramadol: patient preference for pain management    SURGICAL HISTORY:  - Celestone injections: lumbar spine, last injection approximately 6 months ago    WORK STATUS:  - Works as a pharmacy technician  - Employer ([employer]) provides a three-month leave program for the upcoming hip replacement surgery  - No light duty options available    SOCIAL HISTORY:  -       ROS:  Respiratory: -shortness of breath  Musculoskeletal: +joint pain, -muscle pain         R hip pain ref for ELIOT via [facility] program   X 3-4 years   Ant hip, groin, AT to knee and sometimes down to ankle  Occ LBP   Ed Jeremiah offered for ELIOT   Has  numbness in LFCN distribution for a couple years      Past surgical history: None.     Hx DVT: None.     Medications: Tylenol (1,300mg in the morning and then 650mg in the afternoon).     Injections: None.     Physical Therapy: None. Not indicated for bone on bone hip arthritis    Assistive Devices: None.     Walkin - 5 blocks    Limitations:  patient would like to be able to walk without pain, wants to be able to play the piano/organ without pain  Walk dog    Occupation: The patient works for [employer] in [place] as a [email or web address]     Social support: The patient stated that they live at home alone. The patient stated that their friend and sister  would be able to help take care of them if they were to have surgery.        ROS:  The updated medical history is in the chart and has been reviewed. A review of systems is updated and there is no reported vision changes, ear/nose/mouth/throat complaints,  chest pain, shortness of breath, abdominal pain, urological complaints, fevers or chills, psychiatric complaints. Musculoskeletal and neurologcial symptoms are as documented. All other systems are negative.      Objective:   Exam:  There were no vitals filed for this visit.  Body mass index is 29.18 kg/m².    Physical examination included assessment of the patient's general appearance with particular attention to development, nutrition, body habitus, attention to grooming, and any evidence of distress.  Constitutional: The patient is a well-developed, well-nourished patient in no acute distress.   Cardiovascular: Vascular examination included warmth and capillary refill as well inspection for edema and assessment of pedal pulses. Pulses are palpable and regular.  Musculoskeletal: Gait was assessed as to whether it was steady, non-antalgic, and/or required the use of an assist device. The patient was also asked to walk independently and get onto the examination table.  Skin: The skin was examined for any  obvious rashes or lesions in the extremity.  Neurologic: Sensation is intact to light touch in the extremity. The patient has good coordination without hyperreflexia and is alert and oriented to person, place and time and has normal mood and affect.     All of the above were examined and found to be within normal limits except for the following pertinent clinical findings:    Physical Exam    Musculoskeletal: Significant lipid and talcic gait favoring the right hip. Negative Trendelenburg. Non-tender greater trochanter. Groin pain with straight leg raise. Hip flexion: 0 to 90 degrees. Hip abduction: 30 degrees. Hip adduction: 20 degrees. Hip external rotation: 30 degrees. Hip internal rotation: 10 degrees. Leg length discrepancy: 0.5 cm shorter on right side. Full painless knee range of motion.  Skin: Intact skin on anterior hip.  IMAGING:  - X-rays right hip August: severe arthritis in the right hip  - X-rays hip current date: Left hip: Good clear space, indicating presence of cartilage, Right hip: No space visible, bone-on-bone contact, with large bone spurs throughout, Diagnosis: Severe hip arthritis in the right hip           0.5cm LLD R short       Imaging:      HIP R ARTHRITIS        Indication:  Right hip pain  Exam Ordered: Radiographs include an anteroposterior pelvis, an anteroposterior and lateral view of the proximal femur including the hip joint.  Details of Examination: Exam shows evidence of joint space narrowing, osteophyte formation, and subchondral sclerosis, all consistent with degenerative arthritis of the hip.  No other significant findings are noted.  Impression:  Degenerative Arthritis, Right Hip    Tg3 R hip OA, severe bone on bone    Degen scoli L spine      Assessment:     No diagnosis found.     RLS  LBP - hx of injections, no surgery   WILLIE  GERD  ASA 81mg QD    Plan:     Assessment & Plan    M16.10 Unilateral primary osteoarthritis, unspecified hip  Z96.641 Presence of right artificial  hip joint  Z92.21 Personal history of antineoplastic chemotherapy  G57.10 Meralgia paresthetica, unspecified lower limb    HIP OSTEOARTHRITIS AND REPLACEMENT:  - Discussed hip replacement surgery with the patient.  - Explained the procedure, including the removal of the damaged hip joint and replacement with artificial components.  - Described the anesthesia process, involving spinal anesthesia and sedation.  - Outlined post-operative care, including overnight hospital stay and early mobilization.  - Risks include infection (less than 1%), bleeding, nerve damage, potential for a numb spot on the side of the thigh, and the possibility of leg length discrepancy.  - Scheduled the hip replacement surgery for the following day.  - Prescribed 81 mg aspirin twice daily for 30 days after surgery for blood clot prevention.  - Recommend using a walker for the first couple of weeks after surgery until physical therapy deems it safe to stop.    POST-OPERATIVE RECOVERY AND ACTIVITY GUIDELINES:  - Return to work in about 6 weeks after surgery, which is the general return to work time for most people.  - Advised taking the full 3 months off work provided by the [employer] program.  - Avoid driving for about 3-4 weeks after surgery for a right leg procedure.  - Suggested practicing getting in and out of the car safely, and testing ability to slam on the brakes and gas before resuming driving.  - Resume normal activities and plan trips after 3 or 4 months post-surgery.         This patient has significant symptoms in their hip that are affecting their quality of life and daily activities.  They have tried non-operative treatment including analgesics, an exercise program, and activity modification, but the symptoms have persisted. I believe they make a good candidate for hip arthroplasty.     The risks and benefits of hip arthroplasty have been discussed with the patient which include, but are not limited to infections (including severe  sequelae), potential component failure, fracture, DVT, pulmonary embolus, nerve palsy, dislocation, leg length inequality, persistent pain, wound healing complications, transfusions, medical complications and death.     We discussed surgical options including implant type and why I believe the implant selected is a good match for the patient's needs. The patient expressed understanding and agrees to proceed with the planned surgery.     Pre-operative planning will include the following:  A pre-surgical evaluation will be arranged.  Pre-op orders will be placed.  We will make arrangements with the operating room for proper time and staffing.  We will make Social Service arrangements for the patient.    Approach: Anterior    Implants:   Company: [employer]  Cup:  Emphasys      Stem: Actis    DVT prophylaxis: ASA 81mg BID x1 month    Dispo: home health PT    Admission status: Inpatient    Location: [facility]   R+B   ELIOT info    No orders of the defined types were placed in this encounter.      This note was generated with the assistance of ambient listening technology. Verbal consent was obtained by the patient and accompanying visitor(s) for the recording of patient appointment to facilitate this note. I attest to having reviewed and edited the generated note for accuracy, though some syntax or spelling errors may persist. Please contact the author of this note for any clarification.            Past Medical History:   Diagnosis Date    GERD (gastroesophageal reflux disease)     History of colonic polyps     Hypercholesteremia     Lumbosacral spondylosis with radiculopathy     WILLIE on CPAP     Primary osteoarthritis of right hip        Past Surgical History:   Procedure Laterality Date    COLONOSCOPY W/ BIOPSIES AND POLYPECTOMY      WISDOM TOOTH EXTRACTION         Family History   Problem Relation Name Age of Onset    Cancer Mother      Hypertension Maternal Grandmother      Hypertension Maternal Grandfather       Hypertension Paternal Grandmother      Deep vein thrombosis Paternal Grandmother      Hypertension Paternal Grandfather         Social History     Socioeconomic History    Marital status: Significant Other     Spouse name: Samir Mcwilliams    Number of children: 0   Tobacco Use    Smoking status: Former     Current packs/day: 0.00     Average packs/day: 1 pack/day for 15.5 years (15.5 ttl pk-yrs)     Types: Cigarettes     Start date: 2001     Quit date: 2016     Years since quittin.2    Smokeless tobacco: Never   Substance and Sexual Activity    Alcohol use: Yes     Alcohol/week: 2.0 - 3.0 standard drinks of alcohol     Types: 2 - 3 Shots of liquor per week     Comment: once a month    Drug use: Never     Social Drivers of Health     Financial Resource Strain: Low Risk  (2024)    Overall Financial Resource Strain (CARDIA)     Difficulty of Paying Living Expenses: Not hard at all   Food Insecurity: No Food Insecurity (2024)    Hunger Vital Sign     Worried About Running Out of Food in the Last Year: Never true     Ran Out of Food in the Last Year: Never true   Physical Activity: Insufficiently Active (2024)    Exercise Vital Sign     Days of Exercise per Week: 1 day     Minutes of Exercise per Session: 10 min   Stress: No Stress Concern Present (2024)    [place] [place] [employer] - Occupational Stress Questionnaire     Feeling of Stress : Only a little   Housing Stability: Unknown (2024)    Housing Stability Vital Sign     Unable to Pay for Housing in the Last Year: No

## 2024-11-19 NOTE — TELEPHONE ENCOUNTER
.Met with patient during visit in clinic.  Discussed all pre/post op instructions - hibiclens given and use explained, NPO after midnight, expectations post op, bruising, swelling, ice, elevation, p/o d/c meds, DME, bowel mgmt, role of caregiver in patient's care, dressing over incision, PT, blue armband and purpose, when to call  //me/hotline, workflow for stay, my visits at hospital and hotel room, and LUIS paperwork.    Arrival time given to patient and  - 3020     168

## 2024-11-19 NOTE — DISCHARGE INSTRUCTIONS
.Your surgery has been scheduled for:___11/20_______________________________________    You should report to:  ____[facility], located on the [place] side of the first floor of the           [facility] ([phone])  ____[facility], located on the [facility] side of the            Second floor of the [facility] ([phone])  ____3rd Floor [facility] located on the [facility] side of the [facility] [phone]  __X__[facility]: located at [address] [place] [address]. [address].     Please Note   Tell your doctor if you take Aspirin, products containing Aspirin, herbal medications  or blood thinners, such as Coumadin, Ticlid, or Plavix.  (Consult your provider regarding holding or stopping before surgery).  Arrange for someone to drive you home following surgery.  You will not be allowed to leave the surgical facility alone or drive yourself home following sedation and anesthesia.    Before Surgery  Stop taking all herbal medications, vitamins, and supplements 7 days prior to surgery  No Motrin/Advil (Ibuprofen) 7 days before surgery  No Aleve (Naproxen) 7 days before surgery   No Goody's/BC Powder 7 days before surgery  Refrain from drinking alcoholic beverages for 24 hours before and after surgery  Stop or limit smoking at least 24 hours prior to surgery  You may take Tylenol for pain    Night before Surgery  Do not eat or drink after midnight  Take a shower or bath (shower is recommended).  Bathe with Hibiclens soap or an antibacterial soap from the neck down.  If not supplied by your surgeon, hibiclens soap will need to be purchased over the counter in pharmacy.  Rinse soap off thoroughly.  Shampoo your hair with your regular shampoo    The Day of Surgery  Take another bath or shower with hibiclens or any antibacterial soap, to reduce the chance of infection.  Take heart and  blood pressure medications with a small sip of water, as advised by the perioperative team.  Do not take fluid pills  You may brush your teeth and rinse your mouth, but do not swallow any additional water.   Do not apply perfumes, powder, body lotions or deodorant on the day of surgery.  Nail polish should be removed.  Do not wear makeup or moisturizer  Wear comfortable clothes, such as a button front shirt and loose fitting pants.  Leave all jewelry, including body piercings, and valuables at home.    Bring any devices you will need after surgery such as crutches or canes.  If you have sleep apnea, please bring your CPAP machine  In the event that your physical condition changes including the onset of a cold or respiratory illness, or if you have to delay or cancel your surgery, please notify your surgeon.

## 2024-11-20 ENCOUNTER — HOSPITAL ENCOUNTER (INPATIENT)
Facility: HOSPITAL | Age: 54
LOS: 1 days | Discharge: HOME | DRG: 470 | End: 2024-11-21
Attending: ORTHOPAEDIC SURGERY | Admitting: ORTHOPAEDIC SURGERY
Payer: COMMERCIAL

## 2024-11-20 ENCOUNTER — ANESTHESIA (OUTPATIENT)
Dept: SURGERY | Facility: HOSPITAL | Age: 54
End: 2024-11-20
Payer: COMMERCIAL

## 2024-11-20 ENCOUNTER — ANESTHESIA EVENT (OUTPATIENT)
Dept: SURGERY | Facility: HOSPITAL | Age: 54
End: 2024-11-20
Payer: COMMERCIAL

## 2024-11-20 DIAGNOSIS — M16.11 PRIMARY OSTEOARTHRITIS OF RIGHT HIP: ICD-10-CM

## 2024-11-20 PROCEDURE — 99900035 HC TECH TIME PER 15 MIN (STAT)

## 2024-11-20 PROCEDURE — 25000003 PHARM REV CODE 250: Performed by: ORTHOPAEDIC SURGERY

## 2024-11-20 PROCEDURE — 27130 TOTAL HIP ARTHROPLASTY: CPT | Mod: RT,COE,, | Performed by: ORTHOPAEDIC SURGERY

## 2024-11-20 PROCEDURE — 37000009 HC ANESTHESIA EA ADD 15 MINS: Performed by: ORTHOPAEDIC SURGERY

## 2024-11-20 PROCEDURE — 97165 OT EVAL LOW COMPLEX 30 MIN: CPT

## 2024-11-20 PROCEDURE — 71000039 HC RECOVERY, EACH ADD'L HOUR: Performed by: ORTHOPAEDIC SURGERY

## 2024-11-20 PROCEDURE — 25000003 PHARM REV CODE 250

## 2024-11-20 PROCEDURE — 71000033 HC RECOVERY, INTIAL HOUR: Performed by: ORTHOPAEDIC SURGERY

## 2024-11-20 PROCEDURE — 97161 PT EVAL LOW COMPLEX 20 MIN: CPT

## 2024-11-20 PROCEDURE — 63600175 PHARM REV CODE 636 W HCPCS

## 2024-11-20 PROCEDURE — C1713 ANCHOR/SCREW BN/BN,TIS/BN: HCPCS | Performed by: ORTHOPAEDIC SURGERY

## 2024-11-20 PROCEDURE — 25000003 PHARM REV CODE 250: Performed by: ANESTHESIOLOGY

## 2024-11-20 PROCEDURE — 97535 SELF CARE MNGMENT TRAINING: CPT

## 2024-11-20 PROCEDURE — 11000001 HC ACUTE MED/SURG PRIVATE ROOM

## 2024-11-20 PROCEDURE — 63600175 PHARM REV CODE 636 W HCPCS: Performed by: ANESTHESIOLOGY

## 2024-11-20 PROCEDURE — 97530 THERAPEUTIC ACTIVITIES: CPT

## 2024-11-20 PROCEDURE — 36000710: Performed by: ORTHOPAEDIC SURGERY

## 2024-11-20 PROCEDURE — 97116 GAIT TRAINING THERAPY: CPT

## 2024-11-20 PROCEDURE — 27000190 HC CPAP FULL FACE MASK W/VALVE

## 2024-11-20 PROCEDURE — 25000003 PHARM REV CODE 250: Performed by: NURSE ANESTHETIST, CERTIFIED REGISTERED

## 2024-11-20 PROCEDURE — 37000008 HC ANESTHESIA 1ST 15 MINUTES: Performed by: ORTHOPAEDIC SURGERY

## 2024-11-20 PROCEDURE — C1776 JOINT DEVICE (IMPLANTABLE): HCPCS | Performed by: ORTHOPAEDIC SURGERY

## 2024-11-20 PROCEDURE — 94761 N-INVAS EAR/PLS OXIMETRY MLT: CPT

## 2024-11-20 PROCEDURE — 0SR904A REPLACEMENT OF RIGHT HIP JOINT WITH CERAMIC ON POLYETHYLENE SYNTHETIC SUBSTITUTE, UNCEMENTED, OPEN APPROACH: ICD-10-PCS | Performed by: ORTHOPAEDIC SURGERY

## 2024-11-20 PROCEDURE — 94660 CPAP INITIATION&MGMT: CPT

## 2024-11-20 PROCEDURE — 27201423 OPTIME MED/SURG SUP & DEVICES STERILE SUPPLY: Performed by: ORTHOPAEDIC SURGERY

## 2024-11-20 PROCEDURE — 36000711: Performed by: ORTHOPAEDIC SURGERY

## 2024-11-20 PROCEDURE — 63600175 PHARM REV CODE 636 W HCPCS: Performed by: NURSE ANESTHETIST, CERTIFIED REGISTERED

## 2024-11-20 PROCEDURE — 63600175 PHARM REV CODE 636 W HCPCS: Performed by: ORTHOPAEDIC SURGERY

## 2024-11-20 DEVICE — PINNACLE CANCELLOUS BONE SCREW 6.5MM X 20MM
Type: IMPLANTABLE DEVICE | Site: HIP | Status: FUNCTIONAL
Brand: PINNACLE

## 2024-11-20 DEVICE — PINNACLE CANCELLOUS BONE SCREW 6.5MM X 30MM
Type: IMPLANTABLE DEVICE | Site: HIP | Status: FUNCTIONAL
Brand: PINNACLE

## 2024-11-20 DEVICE — ACTIS DUOFIX HIP PROSTHESIS (FEMORAL STEM 12/14 TAPER CEMENTLESS SIZE 8 HIGH COLLAR)  CE
Type: IMPLANTABLE DEVICE | Site: HIP | Status: FUNCTIONAL
Brand: ACTIS

## 2024-11-20 DEVICE — EMPHASYS ACETABULAR SHELL THREE-HOLE 58MM CEMENTLESS
Type: IMPLANTABLE DEVICE | Site: HIP | Status: FUNCTIONAL
Brand: EMPHASYS

## 2024-11-20 DEVICE — BIOLOX DELTA TS CERAMIC FEMORAL HEAD 12/14 TAPER REVISION DIAMETER 40MM +1.5
Type: IMPLANTABLE DEVICE | Site: HIP | Status: FUNCTIONAL
Brand: BIOLOX DELTA

## 2024-11-20 DEVICE — EMPHASYS POLYETHYLENE LINER AOX NEUTRAL 56-58MM 40MM
Type: IMPLANTABLE DEVICE | Site: HIP | Status: FUNCTIONAL
Brand: EMPHASYS

## 2024-11-20 RX ORDER — LIDOCAINE HYDROCHLORIDE 20 MG/ML
INJECTION INTRAVENOUS
Status: DISCONTINUED | OUTPATIENT
Start: 2024-11-20 | End: 2024-11-20

## 2024-11-20 RX ORDER — BETHANECHOL CHLORIDE 10 MG/1
10 TABLET ORAL
Status: COMPLETED | OUTPATIENT
Start: 2024-11-20 | End: 2024-11-20

## 2024-11-20 RX ORDER — TRANEXAMIC ACID 100 MG/ML
INJECTION, SOLUTION INTRAVENOUS
Status: DISCONTINUED | OUTPATIENT
Start: 2024-11-20 | End: 2024-11-20 | Stop reason: HOSPADM

## 2024-11-20 RX ORDER — PROPOFOL 10 MG/ML
VIAL (ML) INTRAVENOUS
Status: DISCONTINUED | OUTPATIENT
Start: 2024-11-20 | End: 2024-11-20

## 2024-11-20 RX ORDER — MORPHINE SULFATE 2 MG/ML
2 INJECTION, SOLUTION INTRAMUSCULAR; INTRAVENOUS
Status: DISCONTINUED | OUTPATIENT
Start: 2024-11-20 | End: 2024-11-21 | Stop reason: HOSPADM

## 2024-11-20 RX ORDER — LIDOCAINE HYDROCHLORIDE 10 MG/ML
1 INJECTION, SOLUTION EPIDURAL; INFILTRATION; INTRACAUDAL; PERINEURAL
Status: DISCONTINUED | OUTPATIENT
Start: 2024-11-20 | End: 2024-11-20 | Stop reason: HOSPADM

## 2024-11-20 RX ORDER — METHOCARBAMOL 750 MG/1
750 TABLET, FILM COATED ORAL 3 TIMES DAILY
Status: DISCONTINUED | OUTPATIENT
Start: 2024-11-20 | End: 2024-11-21 | Stop reason: HOSPADM

## 2024-11-20 RX ORDER — FAMOTIDINE 20 MG/1
20 TABLET, FILM COATED ORAL 2 TIMES DAILY
Status: DISCONTINUED | OUTPATIENT
Start: 2024-11-20 | End: 2024-11-21 | Stop reason: HOSPADM

## 2024-11-20 RX ORDER — SODIUM CHLORIDE 9 MG/ML
INJECTION, SOLUTION INTRAVENOUS CONTINUOUS
Status: DISCONTINUED | OUTPATIENT
Start: 2024-11-20 | End: 2024-11-20

## 2024-11-20 RX ORDER — METHOCARBAMOL 500 MG/1
1000 TABLET, FILM COATED ORAL ONCE
Status: COMPLETED | OUTPATIENT
Start: 2024-11-20 | End: 2024-11-20

## 2024-11-20 RX ORDER — ONDANSETRON HYDROCHLORIDE 2 MG/ML
4 INJECTION, SOLUTION INTRAVENOUS EVERY 8 HOURS PRN
Status: DISCONTINUED | OUTPATIENT
Start: 2024-11-20 | End: 2024-11-21 | Stop reason: HOSPADM

## 2024-11-20 RX ORDER — ONDANSETRON HYDROCHLORIDE 2 MG/ML
INJECTION, SOLUTION INTRAVENOUS
Status: DISCONTINUED | OUTPATIENT
Start: 2024-11-20 | End: 2024-11-20

## 2024-11-20 RX ORDER — NALOXONE HCL 0.4 MG/ML
0.02 VIAL (ML) INJECTION
Status: DISCONTINUED | OUTPATIENT
Start: 2024-11-20 | End: 2024-11-21 | Stop reason: HOSPADM

## 2024-11-20 RX ORDER — CEFAZOLIN 2 G/1
2 INJECTION, POWDER, FOR SOLUTION INTRAMUSCULAR; INTRAVENOUS
Status: COMPLETED | OUTPATIENT
Start: 2024-11-20 | End: 2024-11-20

## 2024-11-20 RX ORDER — DEXAMETHASONE SODIUM PHOSPHATE 4 MG/ML
INJECTION, SOLUTION INTRA-ARTICULAR; INTRALESIONAL; INTRAMUSCULAR; INTRAVENOUS; SOFT TISSUE
Status: DISCONTINUED | OUTPATIENT
Start: 2024-11-20 | End: 2024-11-20

## 2024-11-20 RX ORDER — CEFAZOLIN 2 G/1
2 INJECTION, POWDER, FOR SOLUTION INTRAMUSCULAR; INTRAVENOUS
Status: COMPLETED | OUTPATIENT
Start: 2024-11-20 | End: 2024-11-21

## 2024-11-20 RX ORDER — ACETAMINOPHEN 500 MG
1000 TABLET ORAL EVERY 6 HOURS
Status: DISCONTINUED | OUTPATIENT
Start: 2024-11-20 | End: 2024-11-21 | Stop reason: HOSPADM

## 2024-11-20 RX ORDER — PROCHLORPERAZINE EDISYLATE 5 MG/ML
5 INJECTION INTRAMUSCULAR; INTRAVENOUS EVERY 6 HOURS PRN
Status: DISCONTINUED | OUTPATIENT
Start: 2024-11-20 | End: 2024-11-21 | Stop reason: HOSPADM

## 2024-11-20 RX ORDER — SODIUM CHLORIDE 0.9 % (FLUSH) 0.9 %
3 SYRINGE (ML) INJECTION
Status: DISCONTINUED | OUTPATIENT
Start: 2024-11-20 | End: 2024-11-20 | Stop reason: HOSPADM

## 2024-11-20 RX ORDER — OXYCODONE HYDROCHLORIDE 5 MG/1
5 TABLET ORAL
Status: DISCONTINUED | OUTPATIENT
Start: 2024-11-20 | End: 2024-11-21 | Stop reason: HOSPADM

## 2024-11-20 RX ORDER — PREGABALIN 75 MG/1
75 CAPSULE ORAL NIGHTLY
Status: DISCONTINUED | OUTPATIENT
Start: 2024-11-20 | End: 2024-11-21 | Stop reason: HOSPADM

## 2024-11-20 RX ORDER — PROPOFOL 10 MG/ML
VIAL (ML) INTRAVENOUS CONTINUOUS PRN
Status: DISCONTINUED | OUTPATIENT
Start: 2024-11-20 | End: 2024-11-20

## 2024-11-20 RX ORDER — VANCOMYCIN HYDROCHLORIDE 1 G/20ML
INJECTION, POWDER, LYOPHILIZED, FOR SOLUTION INTRAVENOUS
Status: DISCONTINUED | OUTPATIENT
Start: 2024-11-20 | End: 2024-11-20 | Stop reason: HOSPADM

## 2024-11-20 RX ORDER — PREGABALIN 75 MG/1
75 CAPSULE ORAL
Status: COMPLETED | OUTPATIENT
Start: 2024-11-20 | End: 2024-11-20

## 2024-11-20 RX ORDER — HALOPERIDOL 5 MG/ML
0.5 INJECTION INTRAMUSCULAR EVERY 10 MIN PRN
Status: DISCONTINUED | OUTPATIENT
Start: 2024-11-20 | End: 2024-11-20 | Stop reason: HOSPADM

## 2024-11-20 RX ORDER — POLYETHYLENE GLYCOL 3350 17 G/17G
17 POWDER, FOR SOLUTION ORAL DAILY PRN
Status: DISCONTINUED | OUTPATIENT
Start: 2024-11-20 | End: 2024-11-21 | Stop reason: HOSPADM

## 2024-11-20 RX ORDER — MUPIROCIN 20 MG/G
1 OINTMENT TOPICAL
Status: COMPLETED | OUTPATIENT
Start: 2024-11-20 | End: 2024-11-20

## 2024-11-20 RX ORDER — MUPIROCIN 20 MG/G
1 OINTMENT TOPICAL 2 TIMES DAILY
Status: DISCONTINUED | OUTPATIENT
Start: 2024-11-20 | End: 2024-11-21 | Stop reason: HOSPADM

## 2024-11-20 RX ORDER — ROPIVACAINE/EPI/CLONIDINE/KET 2.46-0.005
SYRINGE (ML) INJECTION
Status: DISCONTINUED | OUTPATIENT
Start: 2024-11-20 | End: 2024-11-20 | Stop reason: HOSPADM

## 2024-11-20 RX ORDER — OXYCODONE HYDROCHLORIDE 10 MG/1
10 TABLET ORAL
Status: DISCONTINUED | OUTPATIENT
Start: 2024-11-20 | End: 2024-11-21 | Stop reason: HOSPADM

## 2024-11-20 RX ORDER — POLYETHYLENE GLYCOL 3350 17 G/17G
17 POWDER, FOR SOLUTION ORAL DAILY
Status: DISCONTINUED | OUTPATIENT
Start: 2024-11-20 | End: 2024-11-21 | Stop reason: HOSPADM

## 2024-11-20 RX ORDER — ACETAMINOPHEN 500 MG
1000 TABLET ORAL
Status: COMPLETED | OUTPATIENT
Start: 2024-11-20 | End: 2024-11-20

## 2024-11-20 RX ORDER — SODIUM CHLORIDE 9 MG/ML
INJECTION, SOLUTION INTRAVENOUS
Status: COMPLETED | OUTPATIENT
Start: 2024-11-20 | End: 2024-11-20

## 2024-11-20 RX ORDER — CELECOXIB 200 MG/1
400 CAPSULE ORAL ONCE
Status: COMPLETED | OUTPATIENT
Start: 2024-11-20 | End: 2024-11-20

## 2024-11-20 RX ORDER — KETAMINE HYDROCHLORIDE 100 MG/ML
INJECTION, SOLUTION INTRAMUSCULAR; INTRAVENOUS
Status: DISCONTINUED | OUTPATIENT
Start: 2024-11-20 | End: 2024-11-20

## 2024-11-20 RX ORDER — MIDAZOLAM HYDROCHLORIDE 1 MG/ML
INJECTION INTRAMUSCULAR; INTRAVENOUS
Status: DISCONTINUED | OUTPATIENT
Start: 2024-11-20 | End: 2024-11-20

## 2024-11-20 RX ORDER — MIDAZOLAM HYDROCHLORIDE 1 MG/ML
4 INJECTION, SOLUTION INTRAMUSCULAR; INTRAVENOUS
Status: DISCONTINUED | OUTPATIENT
Start: 2024-11-20 | End: 2024-11-20 | Stop reason: HOSPADM

## 2024-11-20 RX ORDER — FENTANYL CITRATE 50 UG/ML
100 INJECTION, SOLUTION INTRAMUSCULAR; INTRAVENOUS
Status: DISCONTINUED | OUTPATIENT
Start: 2024-11-20 | End: 2024-11-20 | Stop reason: HOSPADM

## 2024-11-20 RX ORDER — DOCUSATE SODIUM 100 MG
400 CAPSULE ORAL
Status: DISCONTINUED | OUTPATIENT
Start: 2024-11-20 | End: 2024-11-21 | Stop reason: HOSPADM

## 2024-11-20 RX ORDER — FENTANYL CITRATE 50 UG/ML
25 INJECTION, SOLUTION INTRAMUSCULAR; INTRAVENOUS EVERY 5 MIN PRN
Status: DISCONTINUED | OUTPATIENT
Start: 2024-11-20 | End: 2024-11-20 | Stop reason: HOSPADM

## 2024-11-20 RX ORDER — AMOXICILLIN 250 MG
1 CAPSULE ORAL 2 TIMES DAILY
Status: DISCONTINUED | OUTPATIENT
Start: 2024-11-20 | End: 2024-11-21 | Stop reason: HOSPADM

## 2024-11-20 RX ORDER — LIDOCAINE HYDROCHLORIDE AND EPINEPHRINE 15; 5 MG/ML; UG/ML
INJECTION, SOLUTION EPIDURAL
Status: DISCONTINUED | OUTPATIENT
Start: 2024-11-20 | End: 2024-11-20

## 2024-11-20 RX ORDER — ASPIRIN 81 MG/1
81 TABLET ORAL 2 TIMES DAILY
Status: DISCONTINUED | OUTPATIENT
Start: 2024-11-20 | End: 2024-11-21 | Stop reason: HOSPADM

## 2024-11-20 RX ADMIN — BETHANECHOL CHLORIDE 10 MG: 10 TABLET ORAL at 12:11

## 2024-11-20 RX ADMIN — DEXAMETHASONE SODIUM PHOSPHATE 8 MG: 4 INJECTION, SOLUTION INTRAMUSCULAR; INTRAVENOUS at 09:11

## 2024-11-20 RX ADMIN — PROPOFOL 40 MG: 10 INJECTION, EMULSION INTRAVENOUS at 09:11

## 2024-11-20 RX ADMIN — SODIUM CHLORIDE: 0.9 INJECTION, SOLUTION INTRAVENOUS at 10:11

## 2024-11-20 RX ADMIN — MUPIROCIN 1 G: 20 OINTMENT TOPICAL at 09:11

## 2024-11-20 RX ADMIN — TRANEXAMIC ACID 2000 MG: 100 INJECTION INTRAVENOUS at 09:11

## 2024-11-20 RX ADMIN — OXYCODONE 5 MG: 5 TABLET ORAL at 09:11

## 2024-11-20 RX ADMIN — ONDANSETRON 4 MG: 2 INJECTION INTRAMUSCULAR; INTRAVENOUS at 09:11

## 2024-11-20 RX ADMIN — FAMOTIDINE 20 MG: 20 TABLET ORAL at 09:11

## 2024-11-20 RX ADMIN — KETAMINE HYDROCHLORIDE 20 MG: 100 INJECTION INTRAMUSCULAR; INTRAVENOUS at 09:11

## 2024-11-20 RX ADMIN — Medication 400 ML: at 09:11

## 2024-11-20 RX ADMIN — ACETAMINOPHEN 1000 MG: 500 TABLET ORAL at 08:11

## 2024-11-20 RX ADMIN — CEFAZOLIN 2 G: 2 INJECTION, POWDER, FOR SOLUTION INTRAMUSCULAR; INTRAVENOUS at 09:11

## 2024-11-20 RX ADMIN — MEPIVACAINE HYDROCHLORIDE 3 ML: 15 INJECTION, SOLUTION EPIDURAL; INFILTRATION at 09:11

## 2024-11-20 RX ADMIN — BETHANECHOL CHLORIDE 10 MG: 10 TABLET ORAL at 01:11

## 2024-11-20 RX ADMIN — LIDOCAINE HYDROCHLORIDE 75 MG: 20 INJECTION INTRAVENOUS at 09:11

## 2024-11-20 RX ADMIN — CEFAZOLIN 2 G: 2 INJECTION, POWDER, FOR SOLUTION INTRAMUSCULAR; INTRAVENOUS at 05:11

## 2024-11-20 RX ADMIN — VANCOMYCIN HYDROCHLORIDE 1500 MG: 1.5 INJECTION, POWDER, LYOPHILIZED, FOR SOLUTION INTRAVENOUS at 08:11

## 2024-11-20 RX ADMIN — PREGABALIN 75 MG: 75 CAPSULE ORAL at 08:11

## 2024-11-20 RX ADMIN — LIDOCAINE HYDROCHLORIDE,EPINEPHRINE BITARTRATE 4 ML: 15; .005 INJECTION, SOLUTION EPIDURAL; INFILTRATION; INTRACAUDAL; PERINEURAL at 09:11

## 2024-11-20 RX ADMIN — MIDAZOLAM HYDROCHLORIDE 2 MG: 2 INJECTION, SOLUTION INTRAMUSCULAR; INTRAVENOUS at 09:11

## 2024-11-20 RX ADMIN — ACETAMINOPHEN 1000 MG: 500 TABLET ORAL at 01:11

## 2024-11-20 RX ADMIN — PREGABALIN 75 MG: 75 CAPSULE ORAL at 09:11

## 2024-11-20 RX ADMIN — PROPOFOL 150 MCG/KG/MIN: 10 INJECTION, EMULSION INTRAVENOUS at 09:11

## 2024-11-20 RX ADMIN — ASPIRIN 81 MG: 81 TABLET, COATED ORAL at 09:11

## 2024-11-20 RX ADMIN — METHOCARBAMOL 750 MG: 750 TABLET ORAL at 09:11

## 2024-11-20 RX ADMIN — MUPIROCIN 1 G: 20 OINTMENT TOPICAL at 08:11

## 2024-11-20 RX ADMIN — Medication 400 ML: at 01:11

## 2024-11-20 RX ADMIN — FENTANYL CITRATE 25 MCG: 50 INJECTION, SOLUTION INTRAMUSCULAR; INTRAVENOUS at 12:11

## 2024-11-20 RX ADMIN — SODIUM CHLORIDE: 0.9 INJECTION, SOLUTION INTRAVENOUS at 08:11

## 2024-11-20 RX ADMIN — MORPHINE SULFATE 2 MG: 2 INJECTION, SOLUTION INTRAMUSCULAR; INTRAVENOUS at 10:11

## 2024-11-20 RX ADMIN — TRANEXAMIC ACID 1000 MG: 100 INJECTION INTRAVENOUS at 10:11

## 2024-11-20 RX ADMIN — CELECOXIB 400 MG: 200 CAPSULE ORAL at 08:11

## 2024-11-20 RX ADMIN — OXYCODONE 5 MG: 5 TABLET ORAL at 12:11

## 2024-11-20 RX ADMIN — METHOCARBAMOL 1000 MG: 500 TABLET ORAL at 12:11

## 2024-11-20 RX ADMIN — BETHANECHOL CHLORIDE 10 MG: 10 TABLET ORAL at 02:11

## 2024-11-20 RX ADMIN — OXYCODONE HYDROCHLORIDE 10 MG: 10 TABLET ORAL at 05:11

## 2024-11-20 RX ADMIN — POLYETHYLENE GLYCOL 3350 17 G: 17 POWDER, FOR SOLUTION ORAL at 02:11

## 2024-11-20 RX ADMIN — ACETAMINOPHEN 1000 MG: 500 TABLET ORAL at 09:11

## 2024-11-20 RX ADMIN — SENNOSIDES AND DOCUSATE SODIUM 1 TABLET: 50; 8.6 TABLET ORAL at 09:11

## 2024-11-20 RX ADMIN — Medication 400 ML: at 05:11

## 2024-11-20 NOTE — PROGRESS NOTES
Pre op completed.  Patient belongings to be given to partner. Bed in lowest position. Call light within reach. Family at beside. DME not needed. Bear hugger refused.

## 2024-11-20 NOTE — ANESTHESIA PREPROCEDURE EVALUATION
"                                                                                                             11/20/2024  Vladimir Frank is a 54 y.o., male.    Pre-operative evaluation for Procedure(s) (LRB):  ARTHROPLASTY, HIP, TOTAL, ANTERIOR APPROACH: RIGHT: DEPUY - ACTIS + PINNACLE: CORY (Right)      Patient Active Problem List   Diagnosis    WILLIE (obstructive sleep apnea)    Primary osteoarthritis of right hip    Colon polyps    Hyperlipidemia    Vertigo    Restless leg    Low back pain    Elevated blood pressure reading in office without diagnosis of hypertension       Review of patient's allergies indicates:  No Known Allergies    No current facility-administered medications on file prior to encounter.     Current Outpatient Medications on File Prior to Encounter   Medication Sig Dispense Refill    brimonidine 0.15 % OPTH DROP (ALPHAGAN) 0.15 % ophthalmic solution 1 drop 3 (three) times daily.      latanoprost 0.005 % ophthalmic solution 1 drop every evening.      multivitamin (THERAGRAN) per tablet Take 1 tablet by mouth once daily.         Past Surgical History:   Procedure Laterality Date    COLONOSCOPY W/ BIOPSIES AND POLYPECTOMY      WISDOM TOOTH EXTRACTION       INR:  Lab Results   Component Value Date    INR 1.04 10/18/2024       Pre-op Vitals [11/20/24 0750]   BP Pulse Resp Temp SpO2   (!) 151/91 (!) 58 18 36.8 °C (98.2 °F) 98 %      Height Weight BMI (Calculated)     6' 1" 217 lb 28.6            Pre-op Assessment    I have reviewed the Patient Summary Reports.     I have reviewed the Nursing Notes. I have reviewed the NPO Status.      Review of Systems  Anesthesia Hx:  No problems with previous Anesthesia                Social:  Former Smoker, No Alcohol Use       Cardiovascular:  Exercise tolerance: good                                             Pulmonary:        Sleep Apnea                Hepatic/GI:     GERD                Musculoskeletal:  Arthritis                   Physical Exam  General: Well " nourished and Cooperative    Airway:  Mallampati: III / II  Mouth Opening: < 3 cm  Neck ROM: Normal ROM    Dental:  Intact    Chest/Lungs:  Normal Respiratory Rate, Clear to auscultation    Heart:  Rate: Normal  Rhythm: Regular Rhythm        Anesthesia Plan  Type of Anesthesia, risks & benefits discussed:    Anesthesia Type: Gen ETT, Regional, CSE  Intra-op Monitoring Plan: Standard ASA Monitors  Post Op Pain Control Plan: multimodal analgesia and IV/PO Opioids PRN  Induction:  IV  Informed Consent: Informed consent signed with the Patient and all parties understand the risks and agree with anesthesia plan.  All questions answered.   ASA Score: 2    Ready For Surgery From Anesthesia Perspective.     .

## 2024-11-20 NOTE — PT/OT/SLP EVAL
Physical Therapy Evaluation    Patient Name:  Vladimir Frnak   MRN:  [id number]    Recommendations:     Discharge Recommendations: Low Intensity Therapy   Discharge Equipment Recommendations: none   Barriers to discharge: None    Assessment:     Vladimir Frank is a 54 y.o. male admitted with a medical diagnosis of Primary osteoarthritis of right hip.  He presents with the following impairments/functional limitations: impaired functional mobility, decreased lower extremity function, pain, decreased ROM, impaired skin, edema, orthopedic precautions   Pt was agreeable to PT eval. Pt ambulated 120ft x2 with RW and SBA. No SOB or LOB. Pt educated on R Anterior hip precautions and verbalized understanding. Pt would continue to benefit from skilled PT to improve his functional mobility to return home safely.    Rehab Prognosis: Good; patient would benefit from acute skilled PT services to address these deficits and reach maximum level of function.    Recent Surgery: Procedure(s) (LRB):  ARTHROPLASTY, HIP, TOTAL, ANTERIOR APPROACH: RIGHT: DEPUY - ACTIS + PINNACLE: CORY (Right) Day of Surgery    Plan:     During this hospitalization, patient to be seen daily to address the identified rehab impairments via gait training, therapeutic activities, therapeutic exercises, neuromuscular re-education and progress toward the following goals:    Plan of Care Expires:  12/20/24    Subjective     Chief Complaint: Tired of sitting  Patient/Family Comments/goals: Walk pain-free  Pain/Comfort:  Pain Rating 1: 5/10  Location - Side 1: Right  Location - Orientation 1: generalized  Location 1: hip  Pain Addressed 1: Pre-medicate for activity, Reposition, Distraction, Cessation of Activity  Pain Rating Post-Intervention 1: 4/10    Patients cultural, spiritual, Restoration conflicts given the current situation: no    Living Environment:  Pt lives alone in [facility] with 1 PAUL. Pt has walk-in shower with SC.  Prior to admission, patients level of function  was I with mobility and ADLs.  Equipment used at home: Raised toilet seat .  DME owned (not currently used): rolling walker, single point cane, and shower chair.  Upon discharge, patient will have assistance from Partner.    Objective:     Communicated with RN prior to session.  Patient found up in chair with FCD, cryotherapy  upon PT entry to room. Pt's partner present in room    General Precautions: Standard, fall  Orthopedic Precautions:RLE weight bearing as tolerated, RLE anterior precautions   Braces: N/A  Respiratory Status: Room air    Exams:  Cognitive Exam:  Patient is oriented to Person, Place, Time, and Situation  RLE ROM: WFL except limited hip flex due to recent surgery  RLE Strength: WFL except not formally tested due to recent surgery  LLE ROM: WFL  LLE Strength: WFL    Functional Mobility:  Transfers:     Sit to Stand:  contact guard assistance with rolling walker  Gait: 120ft x2 with RW and SBA down hallway. Pt ambulated with step through pattern and good elijah.     AM-PAC 6 CLICK MOBILITY  Total Score:18       Treatment & Education:  Supine/Seated Therex for LE strength/ROM and prevent blood clots x10:  - AP    Pt edu on:  - Importance of mobility for maximum recovery  - Elevation and ice for edema and pain reduction  - Proper transfers and ambulation using RW  - PT role and POC expectations  - HEP handout and explanation  - Proper Weightbearing precautions  - Ankle pumps to decrease chance of blood clots       Patient left up in chair with all lines intact, call button in reach, and RN notified.    GOALS:   Multidisciplinary Problems       Physical Therapy Goals          Problem: Physical Therapy    Goal Priority Disciplines Outcome Interventions   Physical Therapy Goal     PT, PT/OT Progressing    Description: Goals to be met by: 12/20/2024     Patient will increase functional independence with mobility by performing:    Supine to sit with Modified [place]  Sit to supine with Modified  [place]  Sit to stand transfer with Stand-by Assistance  Bed to chair transfer with Stand-by Assistance using Rolling Walker  Gait  x 150 feet with Stand-by Assistance using Rolling Walker.   Ascend/Descend 6 inch curb step with Contact Guard Assistance using Rolling Walker.                         History:     Past Medical History:   Diagnosis Date    GERD (gastroesophageal reflux disease)     History of colonic polyps     Hypercholesteremia     Lumbosacral spondylosis with radiculopathy     WILLIE on CPAP     Primary osteoarthritis of right hip        Past Surgical History:   Procedure Laterality Date    COLONOSCOPY W/ BIOPSIES AND POLYPECTOMY      WISDOM TOOTH EXTRACTION         Time Tracking:     PT Received On: 11/20/24  PT Start Time: 1528     PT Stop Time: 1548  PT Total Time (min): 20 min     Billable Minutes: Evaluation 8 and Gait Training 12      11/20/2024

## 2024-11-20 NOTE — NURSING TRANSFER
Nursing Transfer Note      11/20/2024   1:21 PM    Nurse giving handoff:Josey  Nurse receiving handoff:Tania    Reason patient is being transferred: Overnight Total Joint    Transfer From: PACU to Recovery suites room 318    Transfer via bed    Pain control appropriate. VSS and afebrile. Dressing is clean, dry and intact. Spinal resolved. Ice pack on right hip. Partner at bedside. Belongings sent with patient.    Transported by RN    Transfer Vital Signs:  Blood Pressure:129/87  Heart Rate:53  O2:98  Temperature:98.2  Respirations:22      Order for Tele Monitor? No        Medicines sent: Will be given upon discharge    Patient belongings transferred with patient: Yes    Chart send with patient: Yes    Notified: Partner yocasta      Upon arrival to floor: patient oriented to room, call bell in reach, and bed in lowest position

## 2024-11-20 NOTE — PLAN OF CARE
Problem: Physical Therapy  Goal: Physical Therapy Goal  Description: Goals to be met by: 12/20/2024     Patient will increase functional independence with mobility by performing:    Supine to sit with Modified [place]  Sit to supine with Modified [place]  Sit to stand transfer with Stand-by Assistance  Bed to chair transfer with Stand-by Assistance using Rolling Walker  Gait  x 150 feet with Stand-by Assistance using Rolling Walker.   Ascend/Descend 6 inch curb step with Contact Guard Assistance using Rolling Walker.    Outcome: Progressing

## 2024-11-20 NOTE — ASSESSMENT & PLAN NOTE
Vladimir Frank is a 54 y.o. male s/p R ELIOT 11/20/24. Ding well this morning.    Plan:  Pain control: multimodal  PT/OT: WBAT RLE  DVT PPx: ASA 81 mg BID, FCDs at all times when not ambulating  Abx: home on cefadroxil    Dispo: home this morning

## 2024-11-20 NOTE — PLAN OF CARE
Problem: Pain Acute  Goal: Optimal Pain Control and Function  Intervention: Develop Pain Management Plan  Flowsheets (Taken 11/20/2024 1450)  Pain Management Interventions:   care clustered   cold applied   pain management plan reviewed with patient/caregiver     Problem: Pain Acute  Goal: Optimal Pain Control and Function  Intervention: Prevent or Manage Pain  Flowsheets (Taken 11/20/2024 1450)  Sensory Stimulation Regulation:   care clustered   lighting decreased   quiet environment promoted  Medication Review/Management: medications reviewed     Problem: Pain Acute  Goal: Optimal Pain Control and Function  Intervention: Optimize Psychosocial Wellbeing  Flowsheets (Taken 11/20/2024 1450)  Supportive Measures:   active listening utilized   positive reinforcement provided     Problem: Hip Arthroplasty  Goal: Optimal Coping  Intervention: Support Psychosocial Response to Surgery and Mobility Changes  Flowsheets (Taken 11/20/2024 1450)  Supportive Measures:   active listening utilized   positive reinforcement provided     Problem: Hip Arthroplasty  Goal: Absence of Bleeding  Intervention: Monitor and Manage Bleeding  Flowsheets (Taken 11/20/2024 1450)  Bleeding Management: dressing monitored     Problem: Hip Arthroplasty  Goal: Absence of Infection Signs and Symptoms  Intervention: Prevent or Manage Infection  Flowsheets (Taken 11/20/2024 1450)  Infection Management: aseptic technique maintained     Problem: Hip Arthroplasty  Goal: Acceptable Pain Control  Intervention: Prevent or Manage Pain  Flowsheets (Taken 11/20/2024 1450)  Pain Management Interventions:   care clustered   cold applied   pain management plan reviewed with patient/caregiver     Problem: Wound  Goal: Absence of Infection Signs and Symptoms  Intervention: Prevent or Manage Infection  Flowsheets (Taken 11/20/2024 1450)  Infection Management: aseptic technique maintained     Problem: Wound  Goal: Optimal Pain Control and Function  Intervention:  Prevent or Manage Pain  Flowsheets (Taken 11/20/2024 1450)  Pain Management Interventions:   care clustered   cold applied   pain management plan reviewed with patient/caregiver     Problem: Infection  Goal: Absence of Infection Signs and Symptoms  Intervention: Prevent or Manage Infection  Flowsheets (Taken 11/20/2024 1450)  Infection Management: aseptic technique maintained     Problem: Fall Injury Risk  Goal: Absence of Fall and Fall-Related Injury  Intervention: Identify and Manage Contributors  Flowsheets (Taken 11/20/2024 1450)  Medication Review/Management: medications reviewed     Problem: Fall Injury Risk  Goal: Absence of Fall and Fall-Related Injury  Intervention: Promote Injury-Free Environment  Flowsheets (Taken 11/20/2024 1450)  Safety Promotion/Fall Prevention:   assistive device/personal item within reach   Fall Risk reviewed with patient/family   instructed to call staff for mobility   lighting adjusted   medications reviewed   nonskid shoes/socks when out of bed   side rails raised x 2    Plan of care reviewed with pt, verbalized understanding. Medications and possible side effects reviewed and administered as ordered.  Purposeful rounding completed.  Safety precautions maintained.  Call light placed within reach.

## 2024-11-20 NOTE — ANESTHESIA POSTPROCEDURE EVALUATION
Anesthesia Post Evaluation    Patient: Vladimir Frank    Procedure(s) Performed: Procedure(s) (LRB):  ARTHROPLASTY, HIP, TOTAL, ANTERIOR APPROACH: RIGHT: DEPUY - ACTIS + PINNACLE: CORY (Right)    Final Anesthesia Type: CSE      Patient location during evaluation: PACU  Patient participation: Yes- Able to Participate  Level of consciousness: awake and alert  Post-procedure vital signs: reviewed and stable  Pain management: adequate  Airway patency: patent  WILLIE mitigation strategies: Multimodal analgesia  PONV status at discharge: No PONV  Anesthetic complications: no      Cardiovascular status: blood pressure returned to baseline  Respiratory status: unassisted  Hydration status: euvolemic  Follow-up not needed.              Vitals Value Taken Time   /77 11/20/24 1332   Temp 36.2 °C (97.1 °F) 11/20/24 1332   Pulse 55 11/20/24 1332   Resp 17 11/20/24 1332   SpO2 100 % 11/20/24 1332         Event Time   Out of Recovery 13:19:48         Pain/Jen Score: Pain Rating Prior to Med Admin: 6 (11/20/2024  1:22 PM)  Pain Rating Post Med Admin: 5 (11/20/2024 12:34 PM)  Jen Score: 10 (11/20/2024 12:34 PM)

## 2024-11-20 NOTE — ANESTHESIA PROCEDURE NOTES
CSE    Patient location during procedure: OR  Start time: 11/20/2024 9:27 AM  Timeout: 11/20/2024 9:25 AM  End time: 11/20/2024 9:32 AM      Staffing  Authorizing Provider: Madhavi Carrillo MD  Performing Provider: Madhavi Carrillo MD    Staffing  Performed by: Madhavi Carrillo MD  Authorized by: Madhavi Carrillo MD    Preanesthetic Checklist  Completed: patient identified, IV checked, site marked, risks and benefits discussed, surgical consent, monitors and equipment checked, pre-op evaluation and timeout performed  CSE  Patient position: sitting  Prep: ChloraPrep  Patient monitoring: heart rate, cardiac monitor, continuous pulse ox, continuous capnometry and frequent blood pressure checks  Spinal Needle  Needle type: Rm   Needle gauge: 25 G  Needle length: 5 in  Epidural Needle  Injection technique: RONA air  Needle type: Tuohy   Needle gauge: 17 G  Needle length: 3.5 in  Needle insertion depth: 6 cm  Location: L3-4  Needle localization: anatomical landmarks   Catheter  Catheter type: springwound  Catheter size: 19 G  Catheter at skin depth: 10 cm  Assessment  Sensory level: T8  Intrathecal Medications:   administered: primary anesthetic mcg of    Medications:    Medications: Intrathecal - mepivacaine (CARBOCAINE) injection 15 mg/mL (1.5%) - Intrathecal   3 mL - 11/20/2024 9:32:00 AM

## 2024-11-20 NOTE — OP NOTE
Stevie RUELAS right hip  OPERATIVE NOTE      Date of Operation:   11/20/2024    Providers Performing:   Surgeon(s):  Jarod Hubbard III, MD  Assistant: Dimitri Sidhu MD    Preoperative Diagnosis:   Right hip osteoarthritis, M16.11    Postoperative Diagnosis:   Same    Operative Procedure:   Right Total Hip Arthroplasty, Anterior Approach, CPT 61156    Anesthesia:   Spinal+catheter  EDER cocktail: AURORA    Estimated Blood Loss:   500 cc - MAP 79-80, bone    Specimens:   None    Complications:   None, stable to PACU.    Implants Utilized:   Depuy  Emphasys three-hole acetabular shell; Size 58  Emphasys AOX polyethylene liner; 56-58 OD, 40 ID, neutral  Actis size 8 HO  Biolox Delta TS Ceramic 12/14 taper 40mm + 1.5  Acetabular screw 20mm, 30mm    [place]    Implant Name Type Inv. Item Serial No.  Lot No. LRB No. Used Action   Emphasys acetabular shell three hole    [employer] [id number] Right 1 Implanted   LINER EMPHASYS NEUT [id number] - [id number]  LINER EMPHASYS NEUT [id number]  [employer] [id number] Right 1 Implanted   SCREW PINACLE 6.5MM X 30MM - [id number]  SCREW PINACLE 6.5MM X 30MM  [employer] [id number] Right 1 Implanted   SCREW PINNACLE 6.5 X 20 - [id number]  SCREW PINNACLE 6.5 X 20  [employer] [id number] Right 1 Implanted   STEM DUOFIX HI OFST CLLR SZ8 - [id number]  STEM DUOFIX HI OFST CLLR SZ8  [employer] [id number] Right 1 Implanted   HEAD CED TS CER 12-14 - [id number]  HEAD CED TS CER 12-14  [employer] [id number] Right 1 Implanted       Indications:   The patient has longstanding right hip pain secondary to the above diagnosis.. They have failed conservative management which includes anti-inflammatory medications and activity modification. We reviewed the risks and benefits of the direct anterior hip replacement with the patient prior to surgery including risk of infection, lateral thigh numbness, blood clot, leg length inequality, dislocation, components loosening, components wearing out, need for revision  surgery, continued pain, limping, and injury to nerves and vessels.  After discussing the risks and benefits of the procedure they would like to proceed with surgery.    Operative Notes:   The patient was met in the holding area. The operative site was marked. Anesthesia administered spinal anesthesia. The patient was placed supine on a [place] table and the operative site was identified. The hip was prepped and draped in the usual fashion. IV antibiotics were administered and a timeout was performed.     I performed a direct anterior approach to the hip. Skin incision was made and the fascia of the tensor fascia dalia was identified. I utilized the interval between the tensor fascia dalia and sartorious for direct dissection to the hip joint. I identified and coagulated the ascending branch of the lateral circumflex vessel. Standard retractors over the anterior hip capsule were placed and the capsulotomy was performed. The capsule was tagged for retraction. The femoral head was identified and the femoral neck osteotomy was made in accordance with preoperative templating. The femoral head was then removed with a corkscrew.    The standard anterior, posterior, and superior retractors were placed around the acetabulum. The capsular labrum and foveal contents were removed. Sequential acetabular reamers were used to prepare the acetabulum. Once good good fit was achieved, the final acetabular cup was selected to be two millimeters larger in diameter deep and line to line at the rim than the last reamer used. The cup was checked for a good rim fit and a provisional impaction was performed to verify positioning on fluoroscopy. Once this was satisfactory, the impaction was completed. I checked to make sure there was no overhanging osteophytes anteriorly as well as making sure that there was not excessive acetabular cup exposed. Screws were placed in the cup to augment initial fixation. The final liner was impacted into place  and I confirmed that it was well seated.    The hydraulic hook was placed around the femur. The femur was externally rotated and the standard calcar and greater trochanter retractors were placed. A provisional posterior capsulotomy was performed. Then, gross traction was released and the leg was extended and adducted. The appropriate release was performed to allow elevation of the femur for good visualization of the femoral canal.     A box osteotome was used to open the femoral canal and a canal finder was used to sound the canal. The starter broach and sequential broaches were used until stability was appropriate. A trial reduction was performed and intraoperative fluoroscopy was used to confirm appropriate leg lengths and offset.  Once the trial femoral components appeared appropriately positioned, the hip was dislocated, the femur re-exposed, and the trial femoral components were removed. The canal was irrigated and the final femoral stem was impacted to the level of the neck cut. The final ball was impacted onto a dry trunnion and the hip was reduced checking for appropriate soft tissue tension. Final intra-operative fluoroscopy was obtained to confirm implant positioning, leg length, and offset.     While checking xray, a 0.35% betadine solution was left to soak in the wound. The wound was copiously irrigated. I checked for any residual bleeders and made sure that there was appropriate hemostasis. The local anesthetic cocktail was administered. 1 gram of vancomycin powder was placed in the wound. The wound was closed in layers using #1 vicryl at the fascia, then 2-0 vicryl, 3-0 monocryl, 4-0 monocryl and Prineo Mesh+Dermabond. A sterile dressing was placed.     There were no complications or evidence of intraoperative fracture. All counts were correct.    The first-assistant was critical to all steps of the operation, including retraction during exposure and bone preparation, as well as the deep and  superficial wound closure.  Dr. Hubbard performed and/or supervised the key portions of this surgical procedure, including evaluation of the bone cuts, reshaping of the bony elements, and insertion of the provisional and final components.    Post Op Plan:   The patient will be weightbearing as tolerated with a walker with no extremes of motion  ASA for DVT prophylaxis (81mg BID for one month)  24 hours of IV antibiotics.    Pour/bethanechol    Due patient and or surgical factors the patient will receive an Rx for cefadroxil 500mg BID x7 days after discharge per [place] data:   Waldo MM, Yuval ROGERS, Sarah PAYNE, Safia MCKEON. [facility] Clinical Research Award: Extended Oral Antibiotics Prevent Periprosthetic Joint Infection in High-Risk Cases: 3855 Patients With 1-Year Follow-Up. J Arthroplasty. 2021 Jul;36(7S):S18-S25. doi: 10.1016/j.arth.2021.01.051. Epub 2021 Jan 23. PMID: [id number].    Signed by: Jarod Hubbard III, MD

## 2024-11-20 NOTE — NURSING
Pt arrived to unit via hospital bed from PACU.  Pt aaox4, vss, no s/s of distress noted.  Dressing to right hip cdi.  FCDs on.  Pt instructed to call for assistance when getting up and he verbalized understanding.  Call light placed within reach.

## 2024-11-21 ENCOUNTER — PATIENT MESSAGE (OUTPATIENT)
Dept: ADMINISTRATIVE | Facility: OTHER | Age: 54
End: 2024-11-21
Payer: COMMERCIAL

## 2024-11-21 VITALS
RESPIRATION RATE: 18 BRPM | SYSTOLIC BLOOD PRESSURE: 137 MMHG | HEART RATE: 65 BPM | TEMPERATURE: 98 F | HEIGHT: 73 IN | DIASTOLIC BLOOD PRESSURE: 81 MMHG | BODY MASS INDEX: 28.76 KG/M2 | OXYGEN SATURATION: 98 % | WEIGHT: 217 LBS

## 2024-11-21 LAB
BUN SERPL-MCNC: 15 MG/DL (ref 6–30)
CHLORIDE SERPL-SCNC: 103 MMOL/L (ref 95–110)
CREAT SERPL-MCNC: 0.9 MG/DL (ref 0.5–1.4)
GLUCOSE SERPL-MCNC: 120 MG/DL (ref 70–110)
HCT VFR BLD CALC: 35 %PCV (ref 36–54)
POC IONIZED CALCIUM: 1.14 MMOL/L (ref 1.06–1.42)
POC TCO2 (MEASURED): 23 MMOL/L (ref 23–29)
POTASSIUM BLD-SCNC: 4.3 MMOL/L (ref 3.5–5.1)
SAMPLE: ABNORMAL
SODIUM BLD-SCNC: 135 MMOL/L (ref 136–145)

## 2024-11-21 PROCEDURE — 97530 THERAPEUTIC ACTIVITIES: CPT

## 2024-11-21 PROCEDURE — 97110 THERAPEUTIC EXERCISES: CPT

## 2024-11-21 PROCEDURE — 85014 HEMATOCRIT: CPT

## 2024-11-21 PROCEDURE — 99499 UNLISTED E&M SERVICE: CPT | Mod: FS,COE,, | Performed by: PHYSICIAN ASSISTANT

## 2024-11-21 PROCEDURE — 94660 CPAP INITIATION&MGMT: CPT

## 2024-11-21 PROCEDURE — 25000003 PHARM REV CODE 250

## 2024-11-21 PROCEDURE — 63600175 PHARM REV CODE 636 W HCPCS

## 2024-11-21 PROCEDURE — 94761 N-INVAS EAR/PLS OXIMETRY MLT: CPT

## 2024-11-21 PROCEDURE — 99900035 HC TECH TIME PER 15 MIN (STAT)

## 2024-11-21 PROCEDURE — 84295 ASSAY OF SERUM SODIUM: CPT

## 2024-11-21 PROCEDURE — 25000003 PHARM REV CODE 250: Performed by: ANESTHESIOLOGY

## 2024-11-21 PROCEDURE — 97116 GAIT TRAINING THERAPY: CPT

## 2024-11-21 PROCEDURE — 82330 ASSAY OF CALCIUM: CPT

## 2024-11-21 PROCEDURE — 84132 ASSAY OF SERUM POTASSIUM: CPT

## 2024-11-21 PROCEDURE — 82565 ASSAY OF CREATININE: CPT

## 2024-11-21 PROCEDURE — 97535 SELF CARE MNGMENT TRAINING: CPT

## 2024-11-21 RX ORDER — CELECOXIB 200 MG/1
200 CAPSULE ORAL DAILY
Status: DISCONTINUED | OUTPATIENT
Start: 2024-11-21 | End: 2024-11-21 | Stop reason: HOSPADM

## 2024-11-21 RX ADMIN — POLYETHYLENE GLYCOL 3350 17 G: 17 POWDER, FOR SOLUTION ORAL at 09:11

## 2024-11-21 RX ADMIN — ACETAMINOPHEN 1000 MG: 500 TABLET ORAL at 09:11

## 2024-11-21 RX ADMIN — Medication 400 ML: at 01:11

## 2024-11-21 RX ADMIN — FAMOTIDINE 20 MG: 20 TABLET ORAL at 09:11

## 2024-11-21 RX ADMIN — Medication 400 ML: at 06:11

## 2024-11-21 RX ADMIN — ASPIRIN 81 MG: 81 TABLET, COATED ORAL at 09:11

## 2024-11-21 RX ADMIN — ACETAMINOPHEN 1000 MG: 500 TABLET ORAL at 01:11

## 2024-11-21 RX ADMIN — METHOCARBAMOL 750 MG: 750 TABLET ORAL at 09:11

## 2024-11-21 RX ADMIN — MUPIROCIN 1 G: 20 OINTMENT TOPICAL at 09:11

## 2024-11-21 RX ADMIN — CEFAZOLIN 2 G: 2 INJECTION, POWDER, FOR SOLUTION INTRAMUSCULAR; INTRAVENOUS at 01:11

## 2024-11-21 RX ADMIN — OXYCODONE HYDROCHLORIDE 10 MG: 10 TABLET ORAL at 03:11

## 2024-11-21 RX ADMIN — Medication 400 ML: at 09:11

## 2024-11-21 RX ADMIN — OXYCODONE HYDROCHLORIDE 10 MG: 10 TABLET ORAL at 06:11

## 2024-11-21 RX ADMIN — CELECOXIB 200 MG: 200 CAPSULE ORAL at 09:11

## 2024-11-21 RX ADMIN — SENNOSIDES AND DOCUSATE SODIUM 1 TABLET: 50; 8.6 TABLET ORAL at 09:11

## 2024-11-21 NOTE — PT/OT/SLP EVAL
"Occupational Therapy Evaluation and Treatment    Name: Vladimir Frank  MRN: [id number]  Admitting Diagnosis: Primary osteoarthritis of right hip Day of Surgery  Recent Surgery: Procedure(s) (LRB):  ARTHROPLASTY, HIP, TOTAL, ANTERIOR APPROACH: RIGHT: DEPUY - ACTIS + PINNACLE: CORY (Right) Day of Surgery    Recommendations:     Discharge Recommendations: Low Intensity Therapy  Level of Assistance Recommended: 24 hours supervision  Discharge Equipment Recommendations: hip kit  Barriers to discharge: None    Assessment:     Vladimir Frank is a 54 y.o. male with a medical diagnosis of Primary osteoarthritis of right hip. He presents with performance deficits affecting function including impaired self care skills, impaired functional mobility, orthopedic precautions. Pt was able to perform supine/sit T/F c S and sit/stand, and stand pivot to chair, and stand pivot to BSC/toilet c CGA and RW.   He was able to walk from bed to bathroom and then back to chair c CGA and RW.  Able to perform toilet hygiene and grooming c CGA and RW.  Educated pt on hip precautions.    Rehab Prognosis: Good; patient would benefit from acute OT services to address these deficits and reach maximum level of function.    Plan:     Patient to be seen daily to address the above listed problems via self-care/home management, therapeutic activities, therapeutic exercises  Plan of Care Expires: 11/21/24  Plan of Care Reviewed with: patient, significant other    Subjective     Chief Complaint: R ELIOT  Patient Comments/Goals: "My hip feels so much better."  Pain/Comfort:  Pain Rating 1: 7/10    Patients cultural, spiritual, Advent conflicts given the current situation: no    Social History:  Living Environment: Patient lives alone in a single story home with number of outside stair(s): 1 and walk-in shower  Prior Level of Function: Prior to admission, patient was independent.  Roles and Routines: Patient was driving prior to admission.  Equipment Used at " Home: walker, rolling, shower chair  DME owned (not currently used): rolling walker and shower chair  Assistance Upon Discharge: significant other    Objective:     Communicated with RN prior to session. Patient found supine with cryotherapy, FCD upon OT entry to room.    General Precautions: Standard, fall   Orthopedic Precautions: RLE weight bearing as tolerated, RLE anterior precautions   Braces: N/A    Respiratory Status: Room air    Occupational Performance    Gait belt applied - Yes    Bed Mobility:   Supine to sit from left side of bed with supervision    Functional Mobility/Transfers:  Sit <> Stand Transfer with contact guard assistance with rolling walker  Bed <> Chair Transfer using Stand Pivot technique with contact guard assistance with rolling walker  Toilet Transfer Stand Pivot technique with contact guard assistance with rolling walker and bedside commode  Functional Mobility: Pt was able to walk from bed to bathroom and then back to chair c CGA and RW.    Activities of Daily Living:  Grooming: contact guard assistance to wash hands while standing at sink c RW.  Upper Body Dressing: contact guard assistance to don hospital gown.  Toileting: contact guard assistance for toilet hygiene      Physical Exam:  Upper Extremity Range of Motion:     -       Right Upper Extremity: WFL  -       Left Upper Extremity: WFL  Upper Extremity Strength:    -       Right Upper Extremity: WFL  -       Left Upper Extremity: WFL    AMPAC 6 Click ADL:  AMPAC Total Score: 16    Treatment & Education:  Educated on the importance of mobility to maximize recovery  Educated on the importance of OOB mobility within safe range in order to decrease adverse effects of prolonged bedrest  Educated on Anterior hip precautions - patient recalled 3 hip precautions  Educated on use of hip kit during LB dressing  Educated on safety with functional mobility; hand placement to ensure safe transfers to various surfaces in prep for  ADLs  Educated on performing functional mobility and ADLs in adherence to orthopedic precautions  Educated on weight bearing status  Will continue to educate as needed      Patient clear to ambulate to/from bathroom with RN/PCT, assist x1 .    Patient left up in chair with all lines intact, call button in reach, and RN notified.    GOALS:   Multidisciplinary Problems       Occupational Therapy Goals          Problem: Occupational Therapy    Goal Priority Disciplines Outcome Interventions   Occupational Therapy Goal     OT, PT/OT Progressing    Description: Goals to be met by: 11/21/24     Patient will increase functional independence with ADLs by performing:    UE Dressing with Modified [place].  LE Dressing with Modified [place] and Assistive Devices as needed.  Grooming while standing at sink with Modified [place].  Toileting from bedside commode with Modified [place] for hygiene and clothing management.   Bathing from  shower chair/bench with Modified [place].  Toilet transfer to bedside commode with Modified [place].  Pt will state all hip precautions correctly.                         History:     Past Medical History:   Diagnosis Date    GERD (gastroesophageal reflux disease)     History of colonic polyps     Hypercholesteremia     Lumbosacral spondylosis with radiculopathy     WILLIE on CPAP     Primary osteoarthritis of right hip          Past Surgical History:   Procedure Laterality Date    COLONOSCOPY W/ BIOPSIES AND POLYPECTOMY      WISDOM TOOTH EXTRACTION         Time Tracking:     OT Date of Treatment: 11/20/24  OT Start Time: 1415  OT Stop Time: 1446  OT Total Time (min): 31 min    Billable Minutes: Evaluation 8, Self Care/Home Management 12, and Therapeutic Activity 11    11/20/2024

## 2024-11-21 NOTE — PROGRESS NOTES
[facility])  Orthopedics  Progress Note    Patient Name: Vladimir Frank  MRN: [id number]  Admission Date: 11/20/2024  Hospital Length of Stay: 1 days  Attending Provider: Jarod Hubbard III, MD  Primary Care Provider: Rayshawn Pacheco MD  Follow-up For: Procedure(s) (LRB):  ARTHROPLASTY, HIP, TOTAL, ANTERIOR APPROACH: RIGHT: DEPUY - ACTIS + PINNACLE: CORY (Right)    Post-Operative Day: 1 Day Post-Op  Subjective:     Principal Problem:Primary osteoarthritis of right hip    Principal Orthopedic Problem: same, s/p R ELIOT    Interval History: No acute events overnight. Vitals within normal limits. Pain well controlled. Patient ambulated well yesterday with minimal assistance. Denies numbness, tingling, and weakness. Voiding spontaneously with adequate output. No other acute complaints.    Review of patient's allergies indicates:  No Known Allergies    Current Facility-Administered Medications   Medication    acetaminophen tablet 1,000 mg    aspirin EC tablet 81 mg    celecoxib capsule 200 mg    electrolytes-dextrose (Pedialyte) oral solution 400 mL    famotidine tablet 20 mg    methocarbamoL tablet 750 mg    morphine injection 2 mg    mupirocin 2 % ointment 1 g    naloxone 0.4 mg/mL injection 0.02 mg    ondansetron injection 4 mg    oxyCODONE immediate release tablet 5 mg    oxyCODONE immediate release tablet Tab 10 mg    polyethylene glycol packet 17 g    polyethylene glycol packet 17 g    pregabalin capsule 75 mg    prochlorperazine injection Soln 5 mg    senna-docusate 8.6-50 mg per tablet 1 tablet     Objective:     Vital Signs (Most Recent):  Temp: 97.8 °F (36.6 °C) (11/21/24 0438)  Pulse: 60 (11/21/24 0438)  Resp: 18 (11/21/24 0622)  BP: (!) 140/77 (11/21/24 0438)  SpO2: 100 % (11/21/24 0438) Vital Signs (24h Range):  Temp:  [97.1 °F (36.2 °C)-98.2 °F (36.8 °C)] 97.8 °F (36.6 °C)  Pulse:  [53-77] 60  Resp:  [11-23] 18  SpO2:  [97 %-100 %] 100 %  BP: ()/(52-91) 140/77     Weight: 98.4 kg (217  "lb)  Height: 6' 1" (185.4 cm)  Body mass index is 28.63 kg/m².      Intake/Output Summary (Last 24 hours) at 11/21/2024 0637  Last data filed at 11/21/2024 0623  Gross per 24 hour   Intake 5252 ml   Output 4250 ml   Net 1002 ml        Ortho/SPM Exam  Gen: NAD, sitting comfortably in bed  CV: regular rate  Resp: non-labored respirations    RLE:  Dressing clean, dry, and intact  No significant hematoma over operative site  Appropriate postoperative TTP  SILT Sa/[place]/DP/SP/T  Motor intact EHL/FHL/TA/Gastroc  2+ DP, 2+ PT     Significant Labs: All pertinent labs within the past 24 hours have been reviewed.    Significant Imaging: I have reviewed and interpreted all pertinent imaging results/findings.  Assessment/Plan:     * Primary osteoarthritis of right hip  Vladimir Frank is a 54 y.o. male s/p R ELIOT 11/20/24. Ding well this morning.    Plan:  Pain control: multimodal  PT/OT: WBAT RLE  DVT PPx: ASA 81 mg BID, FCDs at all times when not ambulating  Abx: home on cefadroxil    Dispo: home this morning            Dimitri Sidhu MD  Orthopedics  [facility] ([facility])    "

## 2024-11-21 NOTE — PROGRESS NOTES
Acute Pain Service and Perioperative Surgical Home Progress Note    Interval history  Vladimir Frank is a 54 y.o., male, status post arthroplasty of the right hip no acute events overnight. POUR protocol, patient voiding well.  WILLIE on CPAP overnight no issues.  On exam surgical site is clean and dry, good quad strength plantar and dorsiflexion.    Surgery:  Procedure(s) (LRB):  ARTHROPLASTY, HIP, TOTAL, ANTERIOR APPROACH: RIGHT: DEPUY - ACTIS + PINNACLE: CORY (Right)    Post Op Day #: 1    Problem List:    Active Hospital Problems    Diagnosis  POA    *Primary osteoarthritis of right hip [M16.11]  Yes      Resolved Hospital Problems   No resolved problems to display.       Subjective:       General appearance of alert, oriented, no complaints   Pain with rest: 2    Numbers   Pain with movement: 2    Numbers   Side Effects    1. Pruritis No    2. Nausea No    3. Motor Blockade No, 0=Ability to raise lower extremities off bed    4. Sedation No, 1=awake and alert    Schedule Medications:    acetaminophen  1,000 mg Oral Q6H    aspirin  81 mg Oral BID    celecoxib  200 mg Oral Daily    electrolytes-dextrose  400 mL Oral Q4H    famotidine  20 mg Oral BID    methocarbamoL  750 mg Oral TID    mupirocin  1 g Nasal BID    polyethylene glycol  17 g Oral Daily    pregabalin  75 mg Oral QHS    senna-docusate 8.6-50 mg  1 tablet Oral BID        Continuous Infusions:       PRN Medications:    Current Facility-Administered Medications:     morphine, 2 mg, Intravenous, Q3H PRN    naloxone, 0.02 mg, Intravenous, PRN    ondansetron, 4 mg, Intravenous, Q8H PRN    oxyCODONE, 5 mg, Oral, Q3H PRN    oxyCODONE, 10 mg, Oral, Q3H PRN    polyethylene glycol, 17 g, Oral, Daily PRN    prochlorperazine, 5 mg, Intravenous, Q6H PRN       Antibiotics:  Antibiotics (From admission, onward)      Start     Stop Route Frequency Ordered    11/20/24 2100  mupirocin 2 % ointment 1 g         11/25/24 2059 Nasl 2 times daily 11/20/24 6153          "      Objective:         Vital Signs (Most Recent):  Temp: 97.8 °F (36.6 °C) (11/21/24 0438)  Pulse: 60 (11/21/24 0438)  Resp: 18 (11/21/24 0438)  BP: (!) 140/77 (11/21/24 0438)  SpO2: 100 % (11/21/24 0438) Vital Signs Range (Last 24H):  Temp:  [97.1 °F (36.2 °C)-98.2 °F (36.8 °C)]   Pulse:  [53-77]   Resp:  [11-23]   BP: ()/(52-91)   SpO2:  [97 %-100 %]          I & O (Last 24H):  Intake/Output Summary (Last 24 hours) at 11/21/2024 0529  Last data filed at 11/21/2024 0435  Gross per 24 hour   Intake 4632 ml   Output 4250 ml   Net 382 ml       Physical Exam:    GA: Alert, comfortable, no acute distress.   Pulmonary: Clear to auscultation . Normal respiratory ratei.  Cardiac: regular rate and rhythm.          Laboratory: reviewed in chart  CBC:   Recent Labs     11/21/24 0421   HCT 35*       BMP: No results for input(s): "NA", "K", "CO2", "CL", "BUN", "CREATININE", "GLU", "MG", "PHOS", "CALCIUM" in the last 72 hours.    No results for input(s): "PT", "INR", "PROTIME", "APTT" in the last 72 hours.          Assessment:         Pain control adequate    Plan:  1) Pain: Multimodal pain regimen ordered which includes acetaminophen, celecoxib, pregabalin, and prn oxycodone.  Well controlled on current regimen.  Will continue to monitor.    2) WILLIE, CPAP overnight   3) hyperlipidemia, continue regimen   4) FEN/GI: Tolerating regular diet.     5) Dispo: Pt working well with PT/OT. Case management and SW following along for setting up home health and physical therapy. Plan to discharge home this am.              Evaluator Niraj Ansari PA-C                "

## 2024-11-21 NOTE — PLAN OF CARE
POC reviewed with patient this shift.  A/O x4.  Respirations unlabored on RA.  Skin w/d.  Continent of b/b.  Ambulates to restroom with x1 assist/walker.  Incision to right hip continues with dressing CDI.  No active signs of bleeding/drainage.  Pain continues and being managed with around the clock dosing. Tolerates meds whole with water without difficulty.  VSS.  ABX Tx in progress with no adverse effects noted.  See flowsheet for full assessment.  Able to verbalize wants/needs.  No s/s of distress.  Fall/safety precautions maintained.      Problem: Adult Inpatient Plan of Care  Goal: Plan of Care Review  Outcome: Progressing     Problem: Pain Acute  Goal: Optimal Pain Control and Function  Outcome: Progressing     Problem: Infection  Goal: Absence of Infection Signs and Symptoms  Outcome: Progressing     Problem: Fall Injury Risk  Goal: Absence of Fall and Fall-Related Injury  Outcome: Progressing     Problem: Hip Arthroplasty  Goal: Optimal Functional Ability  Outcome: Progressing

## 2024-11-21 NOTE — PLAN OF CARE
Pt discharged from unit.  Pt aaox4, vss, no s/s of distress noted.  Dressing to right hip remains cdi. IV removed w/ catheter intact, no redness or swelling noted to area.  Discharge instructions given to pt and placed in d/c folder, pt verbalized understanding.  Home meds and f/u appts reviewed as well. Eqmt and meds delivered to bs prior to discharge. Blue Bracelet applied to pt's wrist and instructions given to call # on bracelet w/ any surgery related issues.   Pt left unit via w/c and was accompanied to front  hospital to meet LY for transport to [facility].  All personal belongings sent with pt.

## 2024-11-21 NOTE — SUBJECTIVE & OBJECTIVE
"Principal Problem:Primary osteoarthritis of right hip    Principal Orthopedic Problem: same, s/p R ELIOT    Interval History: No acute events overnight. Vitals within normal limits. Pain well controlled. Patient ambulated well yesterday with minimal assistance. Denies numbness, tingling, and weakness. Voiding spontaneously with adequate output. No other acute complaints.    Review of patient's allergies indicates:  No Known Allergies    Current Facility-Administered Medications   Medication    acetaminophen tablet 1,000 mg    aspirin EC tablet 81 mg    celecoxib capsule 200 mg    electrolytes-dextrose (Pedialyte) oral solution 400 mL    famotidine tablet 20 mg    methocarbamoL tablet 750 mg    morphine injection 2 mg    mupirocin 2 % ointment 1 g    naloxone 0.4 mg/mL injection 0.02 mg    ondansetron injection 4 mg    oxyCODONE immediate release tablet 5 mg    oxyCODONE immediate release tablet Tab 10 mg    polyethylene glycol packet 17 g    polyethylene glycol packet 17 g    pregabalin capsule 75 mg    prochlorperazine injection Soln 5 mg    senna-docusate 8.6-50 mg per tablet 1 tablet     Objective:     Vital Signs (Most Recent):  Temp: 97.8 °F (36.6 °C) (11/21/24 0438)  Pulse: 60 (11/21/24 0438)  Resp: 18 (11/21/24 0622)  BP: (!) 140/77 (11/21/24 0438)  SpO2: 100 % (11/21/24 0438) Vital Signs (24h Range):  Temp:  [97.1 °F (36.2 °C)-98.2 °F (36.8 °C)] 97.8 °F (36.6 °C)  Pulse:  [53-77] 60  Resp:  [11-23] 18  SpO2:  [97 %-100 %] 100 %  BP: ()/(52-91) 140/77     Weight: 98.4 kg (217 lb)  Height: 6' 1" (185.4 cm)  Body mass index is 28.63 kg/m².      Intake/Output Summary (Last 24 hours) at 11/21/2024 0637  Last data filed at 11/21/2024 0623  Gross per 24 hour   Intake 5252 ml   Output 4250 ml   Net 1002 ml        Ortho/SPM Exam  Gen: NAD, sitting comfortably in bed  CV: regular rate  Resp: non-labored respirations    RLE:  Dressing clean, dry, and intact  No significant hematoma over operative site  Appropriate " postoperative TTP  SILT Sa/[place]/DP/SP/T  Motor intact EHL/FHL/TA/Gastroc  2+ DP, 2+ PT     Significant Labs: All pertinent labs within the past 24 hours have been reviewed.    Significant Imaging: I have reviewed and interpreted all pertinent imaging results/findings.

## 2024-11-21 NOTE — PLAN OF CARE
Problem: Occupational Therapy  Goal: Occupational Therapy Goal  Description: Goals to be met by: 11/21/24     Patient will increase functional independence with ADLs by performing:    UE Dressing with Modified [place].  LE Dressing with Modified [place] and Assistive Devices as needed.  Grooming while standing at sink with Modified [place].  Toileting from bedside commode with Modified [place] for hygiene and clothing management.   Bathing from  shower chair/bench with Modified [place].  Toilet transfer to bedside commode with Modified [place].  Pt will state all hip precautions correctly.    Outcome: Progressing

## 2024-11-21 NOTE — PT/OT/SLP PROGRESS
"Occupational Therapy   Treatment and Discharge Note    Name: Vladimir Frank  MRN: [id number]  Admitting Diagnosis:  Primary osteoarthritis of right hip  1 Day Post-Op    Recommendations:     Discharge Recommendations: Low Intensity Therapy  Discharge Equipment Recommendations:  hip kit  Barriers to discharge:  None    Assessment:     Vladimir Frank is a 54 y.o. male with a medical diagnosis of Primary osteoarthritis of right hip.  He presents with the following performance deficits affecting function: impaired self care skills, impaired functional mobility, orthopedic precautions. Pt was willing to participate and tolerated session well overall. He was able to ambulate to bathroom to perform toilet t/f without assistance and return to chair to perform dressing tasks with light assistance. He is currently functioning at appropriate independence levels with adequate support at home to be discharged from acute OT services at this time.    Pt will have support from significant other upon d/c.      Plan:     Patient to be discharged from acute OT services at this time.    Subjective     Chief Complaint: R hip pain  Patient/Family Comments/goals: "I  Pain/Comfort:  Pain Rating 1: 4/10  Location - Side 1: Right  Location - Orientation 1: generalized  Location 1: hip  Pain Addressed 1: Pre-medicate for activity, Reposition, Distraction  Pain Rating Post-Intervention 1:  (not rated)    Objective:     Communicated with: RN prior to session.  Patient found up in chair with cryotherapy, FCD upon OT entry to room.    General Precautions: Standard, fall    Orthopedic Precautions:RLE weight bearing as tolerated, RLE anterior precautions  Braces: N/A  Respiratory Status: Room air     Occupational Performance:     Bed Mobility:    Pt started and finished session in chair    Functional Mobility/Transfers:  Patient completed Sit <> Stand Transfer with stand by assistance  with  rolling walker   Patient completed Toilet Transfer Step " Transfer technique with supervision with  rolling walker  Chair t/f: close SPV c/ RW  Functional Mobility: from chair to bathroom back to chair; close SPV c/ RW; no LOB    Activities of Daily Living:  Grooming: pt declined to perform    Upper Body Dressing: setup/SBA; pt donned shirt sitting in chair    Lower Body Dressing: minimum assistance pt donned underwear, shorts, socks, and shoes seated in chair using hip kit; assistance c/ donning R sock and shoe  Toileting: pt not needing to void at this time        [facility] 6 Click ADL: 21    Treatment & Education:  Pt edu on role of OT, POC, safety when performing self care tasks , benefit of performing OOB activity, and safety when performing functional transfers and mobility.  - White board updated  - Self care tasks completed-- as noted above    - pt educated on anterior hip precautions  - pt educated on hip kit  - pt educated on proper sequencing for LB dressing   - pt educated on proper sequencing for getting into/out of vehicle    Patient left up in chair with all lines intact, call button in reach, RN notified, and significant other present    GOALS:   Multidisciplinary Problems       Occupational Therapy Goals          Problem: Occupational Therapy    Goal Priority Disciplines Outcome Interventions   Occupational Therapy Goal     OT, PT/OT Progressing    Description: Goals to be met by: 11/21/24     Patient will increase functional independence with ADLs by performing:    UE Dressing with Modified [place].  LE Dressing with Modified [place] and Assistive Devices as needed.  Grooming while standing at sink with Modified [place].  Toileting from bedside commode with Modified [place] for hygiene and clothing management.   Bathing from  shower chair/bench with Modified [place].  Toilet transfer to bedside commode with Modified [place].  Pt will state all hip precautions correctly.                         Time Tracking:     OT Date of  Treatment: 11/21/24  OT Start Time: 1123  OT Stop Time: 1150  OT Total Time (min): 27 min    Billable Minutes:Self Care/Home Management 27    OT/SANDY: OT          11/21/2024

## 2024-11-21 NOTE — PLAN OF CARE
Problem: Pain Acute  Goal: Optimal Pain Control and Function  Intervention: Prevent or Manage Pain  Flowsheets (Taken 11/21/2024 0705)  Sensory Stimulation Regulation:   care clustered   lighting decreased   quiet environment promoted  Medication Review/Management: medications reviewed     Problem: Pain Acute  Goal: Optimal Pain Control and Function  Intervention: Optimize Psychosocial Wellbeing  Flowsheets (Taken 11/21/2024 0705)  Supportive Measures:   active listening utilized   positive reinforcement provided     Problem: Hip Arthroplasty  Goal: Optimal Coping  Intervention: Support Psychosocial Response to Surgery and Mobility Changes  Flowsheets (Taken 11/21/2024 0705)  Supportive Measures:   active listening utilized   positive reinforcement provided     Problem: Hip Arthroplasty  Goal: Absence of Bleeding  Intervention: Monitor and Manage Bleeding  Flowsheets (Taken 11/21/2024 0705)  Bleeding Management: dressing monitored     Problem: Hip Arthroplasty  Goal: Absence of Infection Signs and Symptoms  Intervention: Prevent or Manage Infection  Flowsheets (Taken 11/21/2024 0705)  Infection Management: aseptic technique maintained     Problem: Hip Arthroplasty  Goal: Acceptable Pain Control  Intervention: Prevent or Manage Pain  Flowsheets (Taken 11/21/2024 0705)  Pain Management Interventions:   care clustered   cold applied   pain management plan reviewed with patient/caregiver     Problem: Wound  Goal: Absence of Infection Signs and Symptoms  Intervention: Prevent or Manage Infection  Flowsheets (Taken 11/21/2024 0705)  Infection Management: aseptic technique maintained     Problem: Wound  Goal: Optimal Pain Control and Function  Intervention: Prevent or Manage Pain  Flowsheets (Taken 11/21/2024 0705)  Pain Management Interventions:   care clustered   cold applied   pain management plan reviewed with patient/caregiver     Problem: Fall Injury Risk  Goal: Absence of Fall and Fall-Related  Injury  Intervention: Promote Injury-Free Environment  Flowsheets (Taken 11/21/2024 1034)  Safety Promotion/Fall Prevention:   assistive device/personal item within reach   Fall Risk reviewed with patient/family   instructed to call staff for mobility   lighting adjusted   medications reviewed   nonskid shoes/socks when out of bed   side rails raised x 2     Plan of care reviewed with pt, verbalized understanding. Medications and possible side effects reviewed and administered as ordered.  Purposeful rounding completed.  Safety precautions maintained.  Call light placed within reach.  Preparing for discharge.

## 2024-11-22 ENCOUNTER — CLINICAL SUPPORT (OUTPATIENT)
Dept: PHYSICAL THERAPY | Facility: HOSPITAL | Age: 54
End: 2024-11-22
Payer: COMMERCIAL

## 2024-11-22 ENCOUNTER — TELEPHONE (OUTPATIENT)
Dept: ORTHOPEDICS | Facility: CLINIC | Age: 54
End: 2024-11-22
Payer: COMMERCIAL

## 2024-11-22 DIAGNOSIS — M16.11 PRIMARY OSTEOARTHRITIS OF RIGHT HIP: ICD-10-CM

## 2024-11-22 DIAGNOSIS — R26.2 DIFFICULTY IN WALKING INVOLVING JOINT: Primary | ICD-10-CM

## 2024-11-22 PROCEDURE — 97161 PT EVAL LOW COMPLEX 20 MIN: CPT

## 2024-11-22 PROCEDURE — 97110 THERAPEUTIC EXERCISES: CPT

## 2024-11-22 PROCEDURE — 97116 GAIT TRAINING THERAPY: CPT

## 2024-11-22 NOTE — DISCHARGE SUMMARY
[facility]  Orthopedics  Discharge Summary      Patient Name: Vladimir Frank  MRN: [id number]  Admission Date: 11/20/2024  Hospital Length of Stay: 1 days  Discharge Date and Time: 11/21/2024  1:15 PM  Discharging Provider: Dimitri Sidhu MD  Primary Care Provider: Rayshawn Pacheco MD    HPI: Vladimir Frank is a 54 y.o. male with Right hip pain.  Pain is worse with activity and weight bearing.  Patient has experienced interference of activities of daily living due to increased pain and decreased range of motion. Patient has failed non-operative treatment including NSAIDs, as well as greater than 3 months of activity modification. Vladimir Frank ambulates independently.      Relevant medical conditions of significance in perioperative period:  HLD- monitored by PCP  WILLIE- monitored by PCP    Procedure(s) (LRB):  ARTHROPLASTY, HIP, TOTAL, ANTERIOR APPROACH: RIGHT: DEPUY - ACTIS + PINNACLE: CORY (Right)      Hospital Course: On 11/20/24, the patient arrived to the [facility] for pre-operative management.  Upon completion of the pre-operative preparation, the patient was taken back to the operative theatre. The above procedure was performed without complication and the patient was transported to the post anesthesia care unit in stable condition.  After appropriate recovery from the anaesthetic agents used during the surgery, the patient was then transported to the inpatient floor.  The interim of the hospital stay from arrival on the floor up to discharge has been uncomplicated. The patient has tolerated regular diet.  The patient's pain has been controlled using a multimodal approach. Currently, the patient's pain is well controlled on an oral regimen.  The patient has been voiding without difficulty.  The patient began participation in physical therapy after surgery and has progressed throughout the entire hospital stay.  Currently, the patient's progress is sufficient to allow the them to be  discharged to the [facility] safely.  The patient agrees with this assessment and desires a discharge today.          Significant Diagnostic Studies: No pertinent studies.    Pending Diagnostic Studies:       None          Final Active Diagnoses:    Diagnosis Date Noted POA    PRINCIPAL PROBLEM:  Primary osteoarthritis of right hip [M16.11] 10/22/2024 Yes      Problems Resolved During this Admission:      Discharged Condition: good    Disposition: Home or Self Care    Follow Up: in clinic as scheduled    Patient Instructions:      Activity as tolerated     Lifting restrictions   Order Comments: No strenuous exercise or lifting of > 10 lbs     Weight bearing as tolerated     No driving, operating heavy equipment or signing legal documents while taking pain medication     Leave dressing on - Keep it clean, dry, and intact until clinic visit   Order Comments: Do not remove surgical dressing for 2 weeks post-op. This will be done only by MD at initial post-op visit. If dressing is completely saturated, replace with identical dressing - silver-impregnated hydrocolloid dressing.    Do not get dressings wet. Do not shower.    If dressing continues to be saturated or there are signs of infection, please call [facility] [phone] for further instructions and to make appt to be seen.     Call MD for: fluid/liquid/drainage on dressing     Call MD for:  temperature >100.4     Call MD for:  persistent nausea and vomiting     Call MD for:  severe uncontrolled pain     Call MD for:  difficulty breathing, headache or visual disturbances     Call MD for:  redness, tenderness, or signs of infection (pain, swelling, redness, odor or green/yellow discharge around incision site)     Call MD for:  hives     Call MD for:  persistent dizziness or light-headedness     Call MD for:  extreme fatigue     Ok to shower at home, running water only, towel dry, use shower chair for safety, no soaking in a tub or pool for one month.      Medications:  Reconciled Home Medications:      Medication List        CHANGE how you take these medications      acetaminophen 650 MG Tbsr  Commonly known as: TYLENOL  Take 1 tablet (650 mg total) by mouth every 8 (eight) hours.  What changed: Another medication with the same name was removed. Continue taking this medication, and follow the directions you see here.            CONTINUE taking these medications      aspirin 81 MG EC tablet  Commonly known as: ECOTRIN  Take 1 tablet (81 mg total) by mouth 2 (two) times a day.     brimonidine 0.15 % OPTH DROP 0.15 % ophthalmic solution  Commonly known as: ALPHAGAN  1 drop 3 (three) times daily.     cefadroxil 500 MG Cap  Commonly known as: DURICEF  Take 1 capsule (500 mg total) by mouth every 12 (twelve) hours. for 7 days     celecoxib 200 MG capsule  Commonly known as: CeleBREX  Take 1 capsule (200 mg total) by mouth once daily.     latanoprost 0.005 % ophthalmic solution  1 drop every evening.     methocarbamoL 750 MG Tab  Commonly known as: ROBAXIN  Take 1 tablet (750 mg total) by mouth 4 (four) times daily as needed (for muscle spams).     multivitamin per tablet  Commonly known as: THERAGRAN  Take 1 tablet by mouth once daily.     oxyCODONE 5 MG immediate release tablet  Commonly known as: ROXICODONE  Take 1 tablet every 4-6 hours as needed for pain     pantoprazole 40 MG tablet  Commonly known as: PROTONIX  Take 1 tablet (40 mg total) by mouth once daily.     SENEXON-S 8.6-50 mg per tablet  Generic drug: senna-docusate 8.6-50 mg  Take 1 tablet by mouth once daily.              Dimitri Sidhu MD  Orthopedics  [facility]

## 2024-11-22 NOTE — PLAN OF CARE
[facility]  Physical Therapy Initial Evaluation    Name: Vladimir Frank  Clinic Number: [id number]    Therapy Diagnosis:   Encounter Diagnoses   Name Primary?    Primary osteoarthritis of right hip     Difficulty in walking involving joint Yes     Physician: Doctor, Outside    Physician Orders: PT Eval and Treat 11/22/24  Medical Diagnosis from Referral: R Total Hip Arthroplasty  Evaluation Date: 11/22/2024    Time In: 1056  Time Out: 1130  Total Billable Time: 34 minutes    Precautions: Fall    Subjective   Date of surgery: 11/20/24  History of current condition - Vladimir reports: Pt reports moderate pain and discomfort but improvement when taking pain medications     Medical History:   Past Medical History:   Diagnosis Date    GERD (gastroesophageal reflux disease)     History of colonic polyps     Hypercholesteremia     Lumbosacral spondylosis with radiculopathy     WILLIE on CPAP     Primary osteoarthritis of right hip        Surgical History:   Vladimir Frank  has a past surgical history that includes Colonoscopy w/ biopsies and polypectomy; [place] tooth extraction; and Arthroplasty of hip by anterior approach (Right, 11/20/2024).    Medications:   Vladimir has a current medication list which includes the following prescription(s): acetaminophen, aspirin, brimonidine 0.15 % opth drop, cefadroxil, celecoxib, latanoprost, methocarbamol, multivitamin, oxycodone, pantoprazole, and senna-docusate 8.6-50 mg.    Allergies:   Review of patient's allergies indicates:  No Known Allergies     Prior Therapy: Performed at [facility]  Social History: Pt lives in [place] with 1 PAUL. 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 [employer]  Prior Level of Function: IND  Current Level of Function: SBA using RW    Pain:  Current 2/10, worst 9/10, best 7/10   Location: right groin   Description: Aching  Aggravating Factors: Laying  Easing Factors: pain medication and ice    Pts goals: To walk without the  walker    Objective     Functional Mobility:    Bed Mobility:   N/T 2/2 pt sitting up in chair upon PT arrival     Transfers:   Sit <> Stand Transfer: stand by assistance from bedside chair using rolling walker     Balance:   Sitting balance: FAIR+: Maintains balance through MINIMAL excursions of active trunk motion  Standing balance:   FAIR: Maintains without assist but unable to take challenges  FAIR+: Needs CLOSE SUPERVISION during gait and is able to right self with minor LOB                 Gait:  Distance: ~200 ft   Assistive Device: RW  Assistance Level: stand by assistance  Gait Assessment: Pt amb with decreased elijah using step through gait pattern needing increased time to complete trial      TREATMENT   Treatment Time In: 1104  Treatment Time Out: 1130  Total Treatment time separate from Evaluation: 26 minutes    Vladimir received therapeutic exercises to develop strength, endurance, and ROM for 14 minutes including:  Hamstring Curls: 2 sets of 10 reps  LAQs: 2 sets of 10 reps   SAQs: 2 sets of 10 reps  Quad sets: 2 sets of 10 reps  Glute sets: 2 sets of 10 reps  Ankle Pumps: 2 sets of 10 reps      Vladimir participated in gait training to improve functional mobility and safety for 12  minutes, including:  Pt amb ~200 ft with RW and SBA using step through gait pattern and decreased elijah      Home Exercises and Patient Education Provided    Education provided:   - Pt re-educated on anterior hip precautions   Pt educated on amb 2-3x/day during stay  Pt educated to preform HEP 2-3x/day    Written Home Exercises Provided: Patient instructed to cont prior HEP.  Exercises were reviewed and Vladimir was able to demonstrate them prior to the end of the session.  Vladimir demonstrated good  understanding of the education provided.     See EMR under Patient Instructions for exercises provided prior visit.    Assessment   Vladimir is a 54 y.o. male referred to outpatient Physical Therapy with a medical diagnosis of R  total hip arthroplasty. Pt presents with increased pain around front side of hip and naty. Pt able to amb ~200 ft with RW and SBA this session. Pt demonstrated good motivation to continue to progress with therapy.    Pt prognosis is Good.   Pt will benefit from skilled outpatient Physical Therapy to address the deficits stated above and in the chart below, provide pt/family education, and to maximize pt's level of independence.     Plan of care discussed with patient: Yes  Pt's spiritual, cultural and educational needs considered and patient is agreeable to the plan of care and goals as stated below:     Anticipated Barriers for therapy: none    Medical Necessity is demonstrated by the following  History  Co-morbidities and personal factors that may impact the plan of care Co-morbidities:   difficulty sleeping    Personal Factors:   no deficits     low   Examination  Body Structures and Functions, activity limitations and participation restrictions that may impact the plan of care Body Regions:   lower extremities    Body Systems:    balance  gait    Participation Restrictions:   Working    Activity limitations:   Learning and applying knowledge  no deficits    General Tasks and Commands  no deficits    Communication  no deficits    Mobility  walking    Self care  no deficits    Domestic Life  no deficits    Interactions/Relationships  no deficits    Life Areas  employment    Community and Social Life  no deficits         low   Clinical Presentation stable and uncomplicated low   Decision Making/ Complexity Score: low     Goals:  Short Term Goals: 2 weeks   Pt amb ~400 ft with modified independence and RW  Pt able to ascend/descend 10 stairs using Primitivo HR, supervision, without using AD  Pt able to complete HEP x30 repetitions each with independence    Long Term Goals: 4 weeks   Pt amb ~150 ft with supervision using SPC  Pt able to ascend 3 stairs using L hand rail and supervision without and AD    Plan   Plan of care  Certification: 11/22/2024 to 11/25/24.    Outpatient Physical Therapy for 2 additional visits to address safe return home post-operatively.  Therapy to include the following interventions: Gait Training and Therapeutic Exercise.     Katharina Trevino, PT

## 2024-11-22 NOTE — TELEPHONE ENCOUNTER
Contacted patient for post op check in.   Had some pain issues - was trying to avoid taking narcotics; has since started taking them and is feeling better. Dressing CDI. No questions about medications. Has bowel management plan.  Has blue armband.

## 2024-11-22 NOTE — PLAN OF CARE
Problem: Physical Therapy  Goal: Physical Therapy Goal  Description: Goals to be met by: 12/20/2024     Patient will increase functional independence with mobility by performing:    Supine to sit with Modified [place]- Met  Sit to supine with Modified [place]- Met  Sit to stand transfer with Stand-by Assistance- Met  Bed to chair transfer with Stand-by Assistance using Rolling Walker- Met  Gait  x 150 feet with Stand-by Assistance using Rolling Walker. - Met  Ascend/Descend 6 inch curb step with Contact Guard Assistance using Rolling Walker.- Met    Outcome: Met

## 2024-11-22 NOTE — PT/OT/SLP PROGRESS
"Physical Therapy Treatment and Discharge Summary    Patient Name:  Vladimir Frank   MRN:  [id number]    Recommendations:     Discharge Recommendations: Low Intensity Therapy  Discharge Equipment Recommendations: bedside commode, shower chair, hip kit, walker, rolling  Barriers to discharge: None    Assessment:     Vladimir Frank is a 54 y.o. male admitted with a medical diagnosis of Primary osteoarthritis of right hip.  He presents with the following impairments/functional limitations: impaired endurance, weakness, impaired self care skills, impaired functional mobility, gait instability, decreased lower extremity function, pain, decreased ROM, edema, orthopedic precautions Pt progressed  really well with PT today and was able to amb 200' x  2 with RW and SBA for 1st trial and Supervision for 2nd trial .    He ascended/descended 6" curb step with RW and SBA   Pt is doing very well and had no LOB and no dizziness. Pt and his partner demonstrated good understanding of all ed today.  Pt is ready for d/c home today from PT standpoint with his partner  to assist PRN and  will benefit from HHPT for cont PT to maximize  functional recovery, strength and ROM.  .    Rehab Prognosis: Good; patient would benefit from cont  skilled PT services post d/c to address these deficits and reach maximum level of function.    Recent Surgery: Procedure(s) (LRB):  ARTHROPLASTY, HIP, TOTAL, ANTERIOR APPROACH: RIGHT: DEPUY - ACTIS + PINNACLE: CORY (Right) 1 Day Post-Op    Plan:     During this hospitalization, patient to be d/c to [facility] today with his partner to assist PRN and HHPT to address the identified rehab impairments via gait training, therapeutic activities, therapeutic exercises and progress toward the following goals:    Plan of Care Expires:  12/20/24    Subjective     Chief Complaint: pt reports he is doing " a lot better today"  Patient/Family Comments/goals: to go home  Pain/Comfort:  Pain Rating 1: 4/10  Location - Side " "1: Right  Location 1: hip  Pain Addressed 1: Pre-medicate for activity, Reposition, Distraction  Pain Rating Post-Intervention 1: 4/10      Objective:     Communicated with RN prior to session.  Patient found up in chair with cryotherapy, peripheral IV upon PT entry to room.  Pt's partner present in room and for entire PT session.    General Precautions: Standard, fall  Orthopedic Precautions: RLE weight bearing as tolerated, RLE anterior precautions (WBAT with walker, no extremes of motion, hip flexion 0-90 degrees )  Braces: N/A  Respiratory Status: Room air     Functional Mobility:  Bed Mobility:     Supine to Sit: modified independence  Sit to Supine: modified independence  Transfers:     Sit to Stand:  supervision with rolling walker  Gait: Pt amb 200' x 2 trials with RW and SBA for 1st trial and Supervision for 2nd trial .  He required cues for sequencing, heel strike and posture for increase indep and safety with gait.   He amb with decrease elijah and step length with no LOB.  Stairs:  Pt ascended/descended 6" curb step with Rolling Walker with no handrails with Stand-by Assistance.       AM-PAC 6 CLICK MOBILITY  Turning over in bed (including adjusting bedclothes, sheets and blankets)?: 4  Sitting down on and standing up from a chair with arms (e.g., wheelchair, bedside commode, etc.): 3  Moving from lying on back to sitting on the side of the bed?: 4  Moving to and from a bed to a chair (including a wheelchair)?: 3  Need to walk in hospital room?: 3  Climbing 3-5 steps with a railing?: 3  Basic Mobility Total Score: 20       Treatment & Education:  Patient educated on role of acute care PT and PT POC, safety while in hospital including calling nurse for mobility, and call light usage.  Educated about weightbearing as [place] and provided cuing for adherence as appropriate during session.  Educated about importance of OOB mobility and remaining up in chair most of the day.  PT reviewed ant hip precautions with " pt and applications to mobility and he demonstrated good understanding back to PT.    Pt instructed on and performed ant ELIOT HEP (QS, GS, HS within hip precaution range, SAQ) x 10 reps x 3 sets  each for muscle strengthening and endurance. Patient required skilled PT for instruction of exercises and appropriate cues to perform exercises safely and appropriately.  Pt issued written handout of HEP.  PT reviewed and demonstrated car transfers with pt and caregiver and answered all questions.  Pt and caregiver verbalized good understanding back to PT.   Pt ed on use of cryotherapy for edema and pain control, and on safety awareness with use of RW in the home and pt verbalized good understanding back to PT.   PT ed pt and his partner on mobility awareness with 4+ hr drive home next week and they verbalized good understanding back to PT.  PT answered all questions for pt and his partner within PT scope of practice.     Patient left up in chair with all lines intact, call button in reach, RN notified, and partner present..    GOALS:   Multidisciplinary Problems       Physical Therapy Goals       Not on file              Multidisciplinary Problems (Resolved)          Problem: Physical Therapy    Goal Priority Disciplines Outcome Interventions   Physical Therapy Goal   (Resolved)     PT, PT/OT Met    Description: Goals to be met by: 12/20/2024     Patient will increase functional independence with mobility by performing:    Supine to sit with Modified [place]- Met  Sit to supine with Modified [place]- Met  Sit to stand transfer with Stand-by Assistance- Met  Bed to chair transfer with Stand-by Assistance using Rolling Walker- Met  Gait  x 150 feet with Stand-by Assistance using Rolling Walker. - Met  Ascend/Descend 6 inch curb step with Contact Guard Assistance using Rolling Walker.- Met                         Time Tracking:     PT Received On: 11/21/24  PT Start Time: 1207     PT Stop Time: 1247  PT Total Time  (min): 40 min     Billable Minutes: Gait Training 18, Therapeutic Activity 9, and Therapeutic Exercise 13    Treatment Type: Treatment  PT/PTA: PT     Number of PTA visits since last PT visit: 0     11/21/2024

## 2024-11-25 ENCOUNTER — CLINICAL SUPPORT (OUTPATIENT)
Dept: PHYSICAL THERAPY | Facility: HOSPITAL | Age: 54
End: 2024-11-25
Payer: COMMERCIAL

## 2024-11-25 ENCOUNTER — TELEPHONE (OUTPATIENT)
Dept: ORTHOPEDICS | Facility: CLINIC | Age: 54
End: 2024-11-25
Payer: COMMERCIAL

## 2024-11-25 DIAGNOSIS — R26.2 DIFFICULTY IN WALKING INVOLVING JOINT: Primary | ICD-10-CM

## 2024-11-25 PROCEDURE — 97110 THERAPEUTIC EXERCISES: CPT

## 2024-11-25 PROCEDURE — 97116 GAIT TRAINING THERAPY: CPT

## 2024-11-25 NOTE — PLAN OF CARE
[facility])  Discharge Final Note    Primary Care Provider: Rayshawn Pacheco MD    Expected Discharge Date: 11/21/2024    Final Discharge Note (most recent)       Final Note - 11/21/24 1315          Final Note    Assessment Type Final Discharge Note     Anticipated Discharge Disposition Home or Self Care     Hospital Resources/Appts/Education Provided Provided patient/caregiver with written discharge plan information;Provided education on problems/symptoms using teachback                   Future Appointments   Date Time Provider Department Center   11/26/2024  8:00 AM Katharina Trevino, PT [facility]   11/26/2024  9:40 AM Weston Dickinson, PA-C [facility]

## 2024-11-25 NOTE — PROGRESS NOTES
[facility]   Physical Therapy Treatment Note      Name: Vladimir Frank  Clinic Number: [id number]    Therapy Diagnosis:   Encounter Diagnosis   Name Primary?    Difficulty in walking involving joint Yes     Physician: Doctor, Outside    Visit Date: 11/25/2024    Physician Orders: PT Eval and Treat 11/22/24  Medical Diagnosis from Referral: R Total Hip Arthroplasty  Evaluation Date: 11/22/2024     Time In: 0900  Time Out: 0932  Total Billable Time: 32 minutes     Precautions: Fall      Subjective     Patient reports: Pt reports feeling good this morning but had increased pain overnight due to increased amb yesterday.  He was compliant with home exercise program.  Response to previous treatment: mild increase in pain  Functional change: Pt reports he is able to lift his R leg better when amb    Pain: 2/10  Location: right hip      Objective      Objective Measures updated at progress report unless specified.     Treatment     Vladimir received the treatments listed below:      therapeutic exercises to develop strength, endurance, and ROM for 15 minutes including:  Hamstring Curls: 3 sets of 10 reps  LAQs: 3 sets of 10 reps   SAQs: 3 sets of 10 reps  Quad sets: 3 sets of 10 reps  Glute sets: 3 sets of 10 reps  Ankle Pumps: 3 sets of 10 reps    All preformed within anterior hip precautions    gait training to improve functional mobility and safety for 17  minutes, including:  Pt amb ~400 ft with RW and supervision this session using step through gait pattern needing increased time to complete  Pt ascended and descended 10 stairs using Primitivo HR and SBA without an AD. Pt ascended leading with LLE and descended leading with RLE using step through gait pattern.      Patient Education and Home Exercises       Education provided:   Pt re-educated on anterior hip precautions   Pt educated on amb 2-3x/day during stay  Pt educated to preform HEP 2-3x/day    Written Home Exercises Provided: Yes. Exercises  were reviewed and Vladimir was able to demonstrate them prior to the end of the session.  Vladimir demonstrated good  understanding of the education provided. See Electronic Medical Record under Patient Instructions for exercises provided during therapy sessions    Assessment     Pt demonstrates good progress with therapy demonstrated by decrease in assistance needed to perform functional mobility and increased activity tolerance this session. Pt able to ascend and descend 10 stairs this session using Primitivo HR and SBA without an AD this session. Pt reports increase pain overnight needing ice and pain medication to resolve. Pt continues to demonstrated motivation to continue to progress with therapy.    Vladimir Is progressing well towards his goals.   Patient prognosis is Good.     Patient will continue to benefit from skilled outpatient physical therapy to address the deficits listed in the problem list box on initial evaluation, provide pt/family education and to maximize pt's level of independence in the home and community environment.     Patient's spiritual, cultural and educational needs considered and pt agreeable to plan of care and goals.     Anticipated barriers to physical therapy: none    Goals:   Short Term Goals: 2 weeks   Pt amb ~400 ft with modified independence and RW  Pt able to ascend/descend 10 stairs using Primitivo HR, supervision, without using AD  Pt able to complete HEP x30 repetitions each with independence     Long Term Goals: 4 weeks   Pt amb ~150 ft with supervision using SPC  Pt able to ascend 3 stairs using L hand rail and supervision without and AD    Plan     Pt will be seen 11/26/24    Katharina Trevino, PT

## 2024-11-25 NOTE — TELEPHONE ENCOUNTER
Pt doing well.   Minimal swelling and bruising. Ambulating well with walker. Dressing CDI.  No questions or complaints.   PT going well.

## 2024-11-26 ENCOUNTER — CLINICAL SUPPORT (OUTPATIENT)
Dept: PHYSICAL THERAPY | Facility: HOSPITAL | Age: 54
End: 2024-11-26
Payer: COMMERCIAL

## 2024-11-26 ENCOUNTER — OFFICE VISIT (OUTPATIENT)
Dept: ORTHOPEDICS | Facility: CLINIC | Age: 54
End: 2024-11-26
Payer: COMMERCIAL

## 2024-11-26 ENCOUNTER — TELEPHONE (OUTPATIENT)
Dept: ORTHOPEDICS | Facility: CLINIC | Age: 54
End: 2024-11-26
Payer: COMMERCIAL

## 2024-11-26 DIAGNOSIS — Z96.641 S/P TOTAL RIGHT HIP ARTHROPLASTY: Primary | ICD-10-CM

## 2024-11-26 DIAGNOSIS — R26.2 DIFFICULTY IN WALKING INVOLVING JOINT: Primary | ICD-10-CM

## 2024-11-26 PROCEDURE — 97116 GAIT TRAINING THERAPY: CPT

## 2024-11-26 PROCEDURE — 1159F MED LIST DOCD IN RCRD: CPT | Mod: CPTII,S$GLB,COE,

## 2024-11-26 PROCEDURE — 3044F HG A1C LEVEL LT 7.0%: CPT | Mod: CPTII,S$GLB,COE,

## 2024-11-26 PROCEDURE — 97110 THERAPEUTIC EXERCISES: CPT

## 2024-11-26 PROCEDURE — 99024 POSTOP FOLLOW-UP VISIT: CPT | Mod: S$GLB,COE,,

## 2024-11-26 PROCEDURE — 99999 PR PBB SHADOW E&M-EST. PATIENT-LVL III: CPT | Mod: PBBFAC,COE,,

## 2024-11-26 PROCEDURE — 1160F RVW MEDS BY RX/DR IN RCRD: CPT | Mod: CPTII,S$GLB,COE,

## 2024-11-26 RX ORDER — OXYCODONE HYDROCHLORIDE 5 MG/1
TABLET ORAL
Qty: 30 TABLET | Refills: 0 | Status: SHIPPED | OUTPATIENT
Start: 2024-11-26

## 2024-11-26 RX ORDER — OXYCODONE HYDROCHLORIDE 5 MG/1
TABLET ORAL
Qty: 30 TABLET | Refills: 0 | Status: SHIPPED | OUTPATIENT
Start: 2024-11-26 | End: 2024-11-26

## 2024-11-26 RX ORDER — FOLIC ACID/MULTIVIT,IRON,MINER 0.4MG-18MG
400 TABLET ORAL 3 TIMES DAILY
COMMUNITY

## 2024-11-26 RX ORDER — METHOCARBAMOL 750 MG/1
750 TABLET, FILM COATED ORAL 4 TIMES DAILY PRN
Qty: 40 TABLET | Refills: 0 | Status: SHIPPED | OUTPATIENT
Start: 2024-11-26 | End: 2024-11-26 | Stop reason: ALTCHOICE

## 2024-11-26 NOTE — PROGRESS NOTES
[facility]   Physical Therapy Treatment Note      Name: Vladimir Frank  Clinic Number: [id number]    Therapy Diagnosis:   Encounter Diagnosis   Name Primary?    Difficulty in walking involving joint Yes     Physician: Doctor, Outside    Visit Date: 11/26/2024    Physician Orders: PT Eval and Treat 11/22/24  Medical Diagnosis from Referral: R Total Hip Arthroplasty  Evaluation Date: 11/22/2024     Time In: 0756  Time Out: 0826  Total Billable Time: 30 minutes     Precautions: Fall    Subjective     Patient reports: Pt reports increased pain after going out to eat yesterday that has eased.  He was compliant with home exercise program.  Response to previous treatment: mild increased in pain after last session  Functional change: Pt     Pain: 3/10  Location: right hip      Objective      Objective Measures updated at progress report unless specified.     Treatment     Vladimir received the treatments listed below:      therapeutic exercises to develop strength, endurance, and flexibility for 15 minutes including:  Hamstring Curls: 2 sets of 10 reps  LAQs: 2 sets of 15 reps   SAQs: 2 sets of 15 reps  Quad sets: 2 sets of 15 reps  Glute sets: 2 sets of 15 reps  Ankle Pumps: 2 sets of 15 reps    gait training to improve functional mobility and safety for 15  minutes, including:  Pt amb 200 ft with RW and mod I this session using step through gait pattern  Pt amb 200 ft with SPC and SBA-CGA using step to gait pattern progressing to step through gait pattern      Patient Education and Home Exercises       Education provided:   Pt re-educated on anterior hip precautions   Pt educated on amb 2-3x/day during stay  Pt educated to preform HEP 2-3x/day    Written Home Exercises Provided: Yes. Exercises were reviewed and Vladimir was able to demonstrate them prior to the end of the session.  Vladimir demonstrated good  understanding of the education provided. See Electronic Medical Record under Patient  Instructions for exercises provided during therapy sessions    Assessment     Pt reporting increased pain after going out to eat yesterday needing pain medication,ice and rest to relieve. Pt reports 3/10 pain this AM. Pt able to practice amb with SPC today demonstrating step to gait pattern and able to progress to step through gait pattern. Pt demonstrates good potential to continue to progress independently when returning home.    Vladimir Is progressing well towards his goals.   Patient prognosis is Good.     Patient will continue to benefit from skilled outpatient physical therapy to address the deficits listed in the problem list box on initial evaluation, provide pt/family education and to maximize pt's level of independence in the home and community environment.     Patient's spiritual, cultural and educational needs considered and pt agreeable to plan of care and goals.     Anticipated barriers to physical therapy: none    Goals:   Short Term Goals: 2 weeks   Pt amb ~400 ft with modified independence and RW  Pt able to ascend/descend 10 stairs using Prim[employer] HR, supervision, without using AD  Pt able to complete HEP x30 repetitions each with independence     Long Term Goals: 4 weeks   Pt amb ~150 ft with supervision using SPC  Pt able to ascend 3 stairs using L hand rail and supervision without and AD    Plan     Pt discharged from Physical Therapy    Katharina Trevino, PT

## 2024-11-26 NOTE — TELEPHONE ENCOUNTER
Met with patient during ctt appt - doing well overall - no questions or concerns; will call if anything changes.

## 2024-11-26 NOTE — PROGRESS NOTES
Vladimir Frank presents for initial post-operative visit for clearing to travel following a right total hip arthroplasty performed by Dr. Hubbard on 11/20/2024.      Exam:  There were no vitals taken for this visit.   Patient is ambulating well with walker   Bandage is clean and dry without drainage or erythema.    Initial post-operative radiographs reviewed today revealing satisfactory position of the prosthesis.    A/P  1 week s/p right total hip arthroplasty    - Patient was seen by Dr. Hubbard and agreed to the following plan.  - Patient advised to remove bandage in 1 week.  Following removal of bandage, he was advised to keep the incision clean and dry for the next 24 hours after which he  may wash the area with antibacterial soap in the shower, but is advised not to submerge until the incision is completely healed.  - Continue hip precautions x 6 weeks post op.  - Antibiotic prophylaxis reviewed  - Continue aspirin for 1 month post op  - Pain medication:  Refill sent to pharmacy  - Follow up with PCP in 1 week.        N/A

## 2024-12-05 ENCOUNTER — TELEPHONE (OUTPATIENT)
Dept: ORTHOPEDICS | Facility: CLINIC | Age: 54
End: 2024-12-05
Payer: COMMERCIAL

## 2024-12-05 NOTE — TELEPHONE ENCOUNTER
Pt calling back to let us know post-op appt with PCP went well.  removed dsg to right hip; incision looked beautiful with edges approximated; no redness/drainage/swelling. I told pt to leave open to air and continue icing it as needed. Said he is now ambulating with cane without difficulty. Told him to call us/PCP/joint hotline for any issues or concerns. V/u and all questions answered.

## 2024-12-05 NOTE — TELEPHONE ENCOUNTER
Two week post operative phone call for [facility] patients:     - status of swelling/bruising: WDL     - elevating at least 4x a day: yes    - started home physical therapy: yes doing HEP    - had bowel movement: yes    - 2 week post op appointment with home provider completed: scheduled for this afternoon - patient states he will call back and update me after the appt    - dressing removed/incision status: to be done at appt today    No questions or concerns currently.  Will let me know if anything arises.         Will call [facility] RN, [phone], or hotline number, [phone],  if there are further questions or concerns.

## 2025-01-07 ENCOUNTER — PATIENT MESSAGE (OUTPATIENT)
Dept: RESEARCH | Facility: HOSPITAL | Age: 55
End: 2025-01-07
Payer: COMMERCIAL

## 2025-02-23 ENCOUNTER — PATIENT MESSAGE (OUTPATIENT)
Dept: ADMINISTRATIVE | Facility: OTHER | Age: 55
End: 2025-02-23
Payer: COMMERCIAL

## 2025-02-24 ENCOUNTER — PATIENT MESSAGE (OUTPATIENT)
Dept: ORTHOPEDICS | Facility: CLINIC | Age: 55
End: 2025-02-24
Payer: COMMERCIAL

## 2025-07-21 ENCOUNTER — TELEPHONE (OUTPATIENT)
Dept: ORTHOPEDICS | Facility: CLINIC | Age: 55
End: 2025-07-21
Payer: COMMERCIAL

## 2025-07-21 NOTE — TELEPHONE ENCOUNTER
Incoming call from patient - states he is coming in next week for an appt - is asking about this being covered by [facility] program - advised that an elective routine check would not be covered; asking about a hotel rec - advised [facility] easiest option

## 2025-07-28 ENCOUNTER — HOSPITAL ENCOUNTER (OUTPATIENT)
Dept: RADIOLOGY | Facility: HOSPITAL | Age: 55
Discharge: HOME | End: 2025-07-28
Payer: COMMERCIAL

## 2025-07-28 ENCOUNTER — OFFICE VISIT (OUTPATIENT)
Dept: ORTHOPEDICS | Facility: CLINIC | Age: 55
End: 2025-07-28
Payer: COMMERCIAL

## 2025-07-28 VITALS — WEIGHT: 220.44 LBS | HEIGHT: 73 IN | BODY MASS INDEX: 29.22 KG/M2

## 2025-07-28 DIAGNOSIS — Z96.641 S/P TOTAL RIGHT HIP ARTHROPLASTY: Primary | ICD-10-CM

## 2025-07-28 DIAGNOSIS — Z96.641 S/P TOTAL RIGHT HIP ARTHROPLASTY: ICD-10-CM

## 2025-07-28 PROCEDURE — 73502 X-RAY EXAM HIP UNI 2-3 VIEWS: CPT | Mod: TC,RT

## 2025-07-28 PROCEDURE — 99999 PR PBB SHADOW E&M-EST. PATIENT-LVL III: CPT | Mod: PBBFAC,COE,,

## 2025-07-28 PROCEDURE — 73502 X-RAY EXAM HIP UNI 2-3 VIEWS: CPT | Mod: 26,RT,COE, | Performed by: RADIOLOGY

## 2025-07-28 PROCEDURE — 99213 OFFICE O/P EST LOW 20 MIN: CPT | Mod: S$GLB,COE,,

## 2025-07-28 NOTE — PROGRESS NOTES
SUBJECTIVE:     History of Present Illness    CHIEF COMPLAINT:  - Follow-up appointment s/p right total hip arthroplasty.    HPI:  Mr. Frank presents for follow-up approximately 9 months after right hip replacement with Dr. Hubbard. He reports feeling excellent and states that the procedure has significantly improved his quality of life. He expresses gratitude for being able to live pain-free and return to meaningful activities. Pain is still present minimally in the joint after prolonged weight-bearing, sometimes extending down to the lower leg. He mentions occasional difficulty getting in and out of the car when his leg is fatigued. He no longer uses pain medications. Post-surgery, he used a walker for about two weeks, followed by a cane for approximately three weeks. By the six-week mae, he had started walking independently. He has returned to normal function now. Despite being informed it is safe, he lacks confidence to cross his legs. He reports being able to put on socks independently.    He denies using any other mobility aids at present.    PREVIOUS TREATMENTS:  - Mr. Frank used a walker for about 2 weeks post-surgery  - Mr. Frank used a cane for approximately 3 weeks after discontinuing the walker  - At the 6-week mae post-surgery, patient had started walking independently    SURGICAL HISTORY:  - Right hip replacement:  11/20/2024 with Dr. Hubbard          Past Medical History:   Diagnosis Date    GERD (gastroesophageal reflux disease)     History of colonic polyps     Hypercholesteremia     Lumbosacral spondylosis with radiculopathy     WILLIE on CPAP     Primary osteoarthritis of right hip        Past Surgical History:   Procedure Laterality Date    ARTHROPLASTY OF HIP BY ANTERIOR APPROACH Right 11/20/2024    Procedure: ARTHROPLASTY, HIP, TOTAL, ANTERIOR APPROACH: RIGHT: DEPUY - ACTIS + PINNACLE: [facility];  Surgeon: Jarod Hubbard III, MD;  Location: [facility];  Service: Orthopedics;  Laterality: Right;     "COLONOSCOPY W/ BIOPSIES AND POLYPECTOMY      WISDOM TOOTH EXTRACTION         Family History   Problem Relation Name Age of Onset    Cancer Mother      Hypertension Maternal Grandmother      Hypertension Maternal Grandfather      Hypertension Paternal Grandmother      Deep vein thrombosis Paternal Grandmother      Hypertension Paternal Grandfather         Review of patient's allergies indicates:  No Known Allergies    Current Medications[1]      Review of Systems:  ROS:  The updated medical history is in the chart and has been reviewed. A review of systems is updated and there is no reported vision changes, ear/nose/mouth/throat complaints, chest pain, shortness of breath, abdominal pain, urological complaints, fevers or chills, psychiatric complaints. Musculoskeletal and neurologcial symptoms are as documented. All other systems are negative.      OBJECTIVE:     PHYSICAL EXAM:  Ht 6' 1" (1.854 m)   Wt 100 kg (220 lb 7.4 oz)   BMI 29.09 kg/m²   General: Pleasant, cooperative, NAD.  HEENT: NCAT, sclera nonicteric.  Lungs: Respirations are equal and unlabored.   Abdomen: Soft and non-tender.  CV: 2+ bilateral upper and lower extremity pulses.  Neuro: Sensation intact to light touch.  Skin: Intact with healed incision throughout LE with no rashes, erythema, or lesions.  Extremities: No LE edema, NVI lower extremities. normal gait.      right Hip Exam:  TTP: None  90 degrees flexion  0 degrees extension   10 degrees internal rotation  30 degrees external rotation  30 degrees abduction  20 degrees adduction   0 flexion contracture   negative BEREKET   negative FADIR  negative [place]      RADIOGRAPHS:  X-rays of the right hip taken today personally reviewed. Imaging reveals well aligned and positioned prosthesis with no acute fractures, dislocation, or subsidence.    ASSESSMENT:       ICD-10-CM ICD-9-CM   1. S/P total right hip arthroplasty  Z96.641 V43.64       PLAN:     We discussed with the patient at length all " the different treatment options available including anti-inflammatories, acetaminophen, rest, ice, lower extremity strengthening exercise, occasional cortisone injections for temporary relief, and finally surgical intervention.      FOLLOW UP:  - Follow up in clinic as needed.         This note was generated with the assistance of ambient listening technology. Verbal consent was obtained by the patient and accompanying visitor(s) for the recording of patient appointment to facilitate this note. I attest to having reviewed and edited the generated note for accuracy, though some syntax or spelling errors may persist. Please contact the author of this note for any clarification.      Weston Dickinson Jr., PA-C           [1]   Current Outpatient Medications:     brimonidine 0.15 % OPTH DROP (ALPHAGAN) 0.15 % ophthalmic solution, 1 drop 3 (three) times daily., Disp: , Rfl:     cholecalciferol, vitamin D3, (VITAMIN D3) 10 mcg (400 unit) Chew, Take 400 Units by mouth 3 (three) times daily., Disp: , Rfl:     latanoprost 0.005 % ophthalmic solution, 1 drop every evening., Disp: , Rfl:     multivitamin (THERAGRAN) per tablet, Take 1 tablet by mouth once daily., Disp: , Rfl:     acetaminophen (TYLENOL) 650 MG TbSR, Take 1 tablet (650 mg total) by mouth every 8 (eight) hours., Disp: 120 tablet, Rfl: 0    aspirin (ECOTRIN) 81 MG EC tablet, Take 1 tablet (81 mg total) by mouth 2 (two) times a day., Disp: 60 tablet, Rfl: 0    celecoxib (CELEBREX) 200 MG capsule, Take 1 capsule (200 mg total) by mouth once daily., Disp: 30 capsule, Rfl: 0    oxyCODONE (ROXICODONE) 5 MG immediate release tablet, Take 1 tablet every 4-6 hours as needed for pain, Disp: 30 tablet, Rfl: 0    pantoprazole (PROTONIX) 40 MG tablet, Take 1 tablet (40 mg total) by mouth once daily., Disp: 30 tablet, Rfl: 0    senna-docusate 8.6-50 mg (SENNA WITH DOCUSATE SODIUM) 8.6-50 mg per tablet, Take 1 tablet by mouth once daily., Disp: 30 tablet, Rfl: 0

## (undated) DEVICE — Device

## (undated) DEVICE — BLADE SAGITTAL 18 X 1.27 X 90M

## (undated) DEVICE — PACK ECLIPSE UNIVERSAL STERILE

## (undated) DEVICE — TAPE SILK 3IN

## (undated) DEVICE — DRESSING TRANS 4X4 TEGADERM

## (undated) DEVICE — SUT MONOCYRL 4-0 PS2 UND

## (undated) DEVICE — BRUSH SCRUB HIBICLENS 4%

## (undated) DEVICE — DRAPE THREE-QTR REINF 53X77IN

## (undated) DEVICE — BNDG COFLEX FOAM LF2 ST 6X5YD

## (undated) DEVICE — SYS CLSR DERMABOND PRINEO 22CM

## (undated) DEVICE — TOWEL COTTON SURG WHT 27X17IN

## (undated) DEVICE — SUT 2/0 36IN COATED VICRYL

## (undated) DEVICE — ELECTRODE REM PLYHSV RETURN 9

## (undated) DEVICE — SUT MONOCRYL 3-0 PS-2 UND

## (undated) DEVICE — UNDERGLOVES BIOGEL PI SIZE 7.5

## (undated) DEVICE — DRESSING AQUACEL AG RBBN 2X45

## (undated) DEVICE — GLOVE BIOGEL SKINSENSE PI 8.0

## (undated) DEVICE — HOOD T7 W/ PEEL AWAY LENS

## (undated) DEVICE — UNDERGLOVES BIOGEL PI SIZE 8.5

## (undated) DEVICE — SOL NACL IRR 1000ML BTL

## (undated) DEVICE — SEALER AQUAMANTYS 3.48MM

## (undated) DEVICE — DRESSING TELFA N ADH 3X8

## (undated) DEVICE — DRAPE IOBAN 2 STERI

## (undated) DEVICE — PULSAVAC ZIMMER

## (undated) DEVICE — TOWEL OR DISP STRL BLUE 4/PK

## (undated) DEVICE — SUT 1 36IN COATED VICRYL UN

## (undated) DEVICE — DRAPE C-ARM ELAS CLIP 42X120IN

## (undated) DEVICE — ALCOHOL 70% ANTISEPTIC ISO 4OZ

## (undated) DEVICE — GOWN SMARTGOWN 3XL XLONG

## (undated) DEVICE — KIT PT CARE HANA PROFX SSXT

## (undated) DEVICE — PENCIL SMK EVAC CONNECTOR 10FT

## (undated) DEVICE — PENCIL ROCKER SWITCH 10FT CORD

## (undated) DEVICE — GLOVE BIOGEL PI MICRO SZ 7

## (undated) DEVICE — SPONGE COTTON TRAY 4X4IN

## (undated) DEVICE — SOL BETADINE 5%

## (undated) DEVICE — NDL HYPO STD REG BVL 18GX1.5IN